# Patient Record
Sex: MALE | Race: WHITE | Employment: OTHER | URBAN - METROPOLITAN AREA
[De-identification: names, ages, dates, MRNs, and addresses within clinical notes are randomized per-mention and may not be internally consistent; named-entity substitution may affect disease eponyms.]

---

## 2017-06-01 ENCOUNTER — OFFICE VISIT (OUTPATIENT)
Dept: FAMILY MEDICINE CLINIC | Age: 67
End: 2017-06-01

## 2017-06-01 VITALS
TEMPERATURE: 97.8 F | BODY MASS INDEX: 25.47 KG/M2 | WEIGHT: 188 LBS | HEIGHT: 72 IN | HEART RATE: 80 BPM | RESPIRATION RATE: 16 BRPM | DIASTOLIC BLOOD PRESSURE: 76 MMHG | SYSTOLIC BLOOD PRESSURE: 118 MMHG | OXYGEN SATURATION: 98 %

## 2017-06-01 VITALS
HEIGHT: 72 IN | BODY MASS INDEX: 25.47 KG/M2 | HEART RATE: 80 BPM | OXYGEN SATURATION: 98 % | RESPIRATION RATE: 16 BRPM | TEMPERATURE: 97.8 F | WEIGHT: 188 LBS | DIASTOLIC BLOOD PRESSURE: 76 MMHG | SYSTOLIC BLOOD PRESSURE: 118 MMHG

## 2017-06-01 DIAGNOSIS — Z00.00 ROUTINE GENERAL MEDICAL EXAMINATION AT A HEALTH CARE FACILITY: Primary | ICD-10-CM

## 2017-06-01 DIAGNOSIS — Z12.5 SCREENING FOR PROSTATE CANCER: ICD-10-CM

## 2017-06-01 DIAGNOSIS — Z13.31 SCREENING FOR DEPRESSION: ICD-10-CM

## 2017-06-01 RX ORDER — AMOXICILLIN AND CLAVULANATE POTASSIUM 875; 125 MG/1; MG/1
875 TABLET, FILM COATED ORAL 2 TIMES DAILY
COMMUNITY
Start: 2017-05-31 | End: 2018-06-04 | Stop reason: ALTCHOICE

## 2017-06-01 RX ORDER — CHLORHEXIDINE GLUCONATE 1.2 MG/ML
RINSE ORAL
COMMUNITY
Start: 2017-05-18 | End: 2017-06-01

## 2017-06-01 RX ORDER — TRIAMCINOLONE ACETONIDE 1 MG/G
PASTE DENTAL
COMMUNITY
Start: 2017-05-09 | End: 2018-06-04 | Stop reason: ALTCHOICE

## 2017-06-01 RX ORDER — TADALAFIL 5 MG/1
5 TABLET ORAL AS NEEDED
Qty: 180 TAB | Refills: 1 | Status: CANCELLED | OUTPATIENT
Start: 2017-06-01

## 2017-06-01 RX ORDER — TRAMADOL HYDROCHLORIDE 50 MG/1
TABLET ORAL
COMMUNITY
Start: 2017-05-31 | End: 2017-06-01

## 2017-06-01 RX ORDER — CETIRIZINE HCL 10 MG
10 TABLET ORAL
COMMUNITY

## 2017-06-01 RX ORDER — IBUPROFEN 800 MG/1
800 TABLET ORAL
COMMUNITY
Start: 2017-05-31 | End: 2018-06-04 | Stop reason: ALTCHOICE

## 2017-06-01 NOTE — PROGRESS NOTES
This is a Subsequent Medicare Annual Wellness Visit providing Personalized Prevention Plan Services (PPPS) (Performed 12 months after initial AWV and PPPS )    I have reviewed the patient's medical history in detail and updated the computerized patient record. History     Past Medical History:   Diagnosis Date    Agatston coronary artery calcium score less than 100 2/10/2014    Coronary calcium score of 82.  Arthritis     right shoulder, SC joint    ED (erectile dysfunction)     Giant cell granuloma     oral, lower inner gingiva    H/O colonoscopy 3/2005 / 03-    6mm polyp every 5 years based on pathology report    Hearing disorder, sensorineural 1/28/2014    Using aides    Hypercholesteremia     Onychomycosis     toenails      Past Surgical History:   Procedure Laterality Date    HX CATARACT REMOVAL      HX ORTHOPAEDIC Right 1980    plantar fasciitis, neurofibroma?  HX OTHER SURGICAL  2008    peripheral giant cell granuloma removal     HX TURP  2009    for BPH     HX VASECTOMY  1984     Current Outpatient Prescriptions   Medication Sig Dispense Refill    amoxicillin-clavulanate (AUGMENTIN) 875-125 mg per tablet Take 875 Tabs by mouth two (2) times a day.  chlorhexidine (PERIDEX) 0.12 % solution       OXYMETAZOLINE HCL (AFRIN NASAL SPRAY NA) by Nasal route.  cetirizine (ZYRTEC) 10 mg tablet Take 10 mg by mouth two (2) times a day.  tadalafil (CIALIS) 5 mg tablet Take 1 Tab by mouth as needed. 180 Tab 1    KRILL OIL PO Take 1 capsule by mouth daily.  GLUCOSAMINE/CHONDROITIN SULF A (GLUCOSAMINE-CHONDROITIN PO) Take 1,500 mg by mouth daily.  BENEFIBER, GUAR GUM, PO Take  by mouth. 1 tbsp daily      multivitamin (ONE A DAY) tablet Take 1 Tab by mouth daily.  aspirin delayed-release 81 mg tablet Take 81 mg by mouth daily.  Cholecalciferol, Vitamin D3, (VITAMIN D3) 1,000 unit cap Take 2 tablets by mouth daily.       ibuprofen (MOTRIN) 800 mg tablet Take 800 mg by mouth every eight (8) hours as needed.  triamcinolone acetonide (KENALOG) 0.1 % dental paste       traMADol (ULTRAM) 50 mg tablet       ibuprofen (MOTRIN IB) 200 mg tablet Take 400 mg by mouth as needed for Pain. Allergies   Allergen Reactions    Benzocaine Swelling    Codeine Nausea Only     Family History   Problem Relation Age of Onset    Depression Mother     Alcohol abuse Mother     Other Mother      tobacco use    Breast Cancer Mother    Martha Muñoz Arthritis-osteo Mother     Cancer Mother 80     skin/pancreatic     Alcohol abuse Brother     Other Brother      tobacco use    Other Father      tobacco use    Hypertension Father     Arthritis-osteo Father      Social History   Substance Use Topics    Smoking status: Never Smoker    Smokeless tobacco: Never Used    Alcohol use 1.0 oz/week     2 Standard drinks or equivalent per week     Patient Active Problem List   Diagnosis Code    BPH (benign prostatic hypertrophy) N40.0    Hearing disorder, sensorineural H90.5    Erectile dysfunction N52.9    Dyslipidemia E78.5    Advanced care planning/counseling discussion Z71.89     Depression Risk Factor Screening:     PHQ over the last two weeks 6/1/2017   Little interest or pleasure in doing things Not at all   Feeling down, depressed or hopeless Not at all   Total Score PHQ 2 0     Alcohol Risk Factor Screening: On any occasion during the past 3 months, have you had more than 4 drinks containing alcohol? Yes    Do you average more than 14 drinks per week? No      Functional Ability and Level of Safety:     Hearing Loss   moderate  Wears hearing aids    Activities of Daily Living   Self-care. Requires assistance with: no ADLs    Fall Risk     Fall Risk Assessment, last 12 mths 6/1/2017   Able to walk? Yes   Fall in past 12 months?  No     Abuse Screen   Patient is not abused    Review of Systems   A comprehensive review of systems was negative except for that written in the HPI.    Physical Examination     Evaluation of Cognitive Function:  Mood/affect:  happy  Appearance: age appropriate and casually dressed  Family member/caregiver input:  None present    Visit Vitals    /76 (BP 1 Location: Left arm, BP Patient Position: Sitting)    Pulse 80    Temp 97.8 °F (36.6 °C) (Oral)    Resp 16    Ht 6' (1.829 m)    Wt 188 lb (85.3 kg)    SpO2 98%    BMI 25.5 kg/m2     General appearance: alert, cooperative, no distress, appears stated age  Head: Normocephalic, without obvious abnormality, atraumatic  Eyes: negative findings: anicteric sclera, lids and lashes normal, conjunctivae and sclerae normal, corneas clear and pupils equal, round, reactive to light and accomodation  Ears: normal TM's and external ear canals AU  Nose: Nares normal. Septum midline. Mucosa normal. No drainage or sinus tenderness. Throat: Lips, mucosa, and tongue normal. Teeth and gums normal  Neck: supple, symmetrical, trachea midline, no adenopathy, thyroid: not enlarged, symmetric, no tenderness/mass/nodules and no JVD  Lungs: clear to auscultation bilaterally  Heart: regular rate and rhythm, S1, S2 normal, no murmur, click, rub or gallop  Abdomen: normal findings: no masses palpable, no organomegaly, soft, non-tender  Extremities: no edema    Patient Care Team:  Paresh Kennedy MD as PCP - General (Family Practice)  Bessy Kim MD (Dermatology)  Shanae Gallagher MD (Ophthalmology)  Diego Stovall DPM (Podiatry)  Uvaldo Burk MD (Oral Surgery)  Hodan Limon MD (Gastroenterology)    Advice/Referrals/Counseling   Education and counseling provided:  Are appropriate based on today's review and evaluation    Assessment/Plan       ICD-10-CM ICD-9-CM    1. Routine general medical examination at a health care facility Z00.00 V70.0    2. Screening for depression Z13.89 V79.0 DEPRESSION SCREEN ANNUAL   3.  Screening for prostate cancer Z12.5 V76.44 PROSTATE SPECIFIC AG (PSA)     Age and sex specific counseling. Screening for depression and alcoholism. Advanced directives / end of life planning discussion and packet provided for review at home. Care team updated. Medicare recommended screening and prevention test outlook is reviewed and provided to patient. Screening and prevention is up to date but declined Hep C screen. Patient understands our medical plan. Patient has provided input and agrees with goals. All questions answered.

## 2017-06-01 NOTE — MR AVS SNAPSHOT
Visit Information Date & Time Provider Department Dept. Phone Encounter #  
 6/1/2017  9:15 AM Neida Webster, [address] [id number] Follow-up Instructions Return in about 1 year (around 6/1/2018) for annual medicare wellness. Upcoming Health Maintenance Date Due  
 MEDICARE YEARLY EXAM 6/10/2017 INFLUENZA AGE 9 TO ADULT 8/1/2017 GLAUCOMA SCREENING Q2Y 10/13/2017 COLONOSCOPY 3/11/2020 DTaP/Tdap/Td series (2 - Td) 5/13/2021 Allergies as of 6/1/2017  Review Complete On: 6/1/2017 By: Neida Webster MD  
  
 Severity Noted Reaction Type Reactions Benzocaine  01/28/2014    Swelling Codeine  01/28/2014    Nausea Only Current Immunizations  Reviewed on 10/24/2016 Name Date Influenza Vaccine 10/1/2016 Pneumococcal Conjugate (PCV-13) 10/24/2016 Pneumococcal Polysaccharide (PPSV-23) 7/28/2015 Tdap 5/13/2011 Zoster Vaccine, Live 8/5/2011 Not reviewed this visit Vitals BP Pulse Temp Resp Height(growth percentile) Weight(growth percentile) 118/76 (BP 1 Location: Left arm, BP Patient Position: Sitting) 80 97.8 °F (36.6 °C) (Oral) 16 6' (1.829 m) 188 lb (85.3 kg) SpO2 BMI Smoking Status 98% 25.5 kg/m2 Never Smoker Vitals History BMI and BSA Data Body Mass Index Body Surface Area 25.5 kg/m 2 2.08 m 2 Preferred Pharmacy Pharmacy Name Phone [facility] [address] [place] [address] [phone] Your Updated Medication List  
  
   
This list is accurate as of: 6/1/17  9:36 AM.  Always use your most recent med list.  
  
  
  
  
 amoxicillin-clavulanate 875-125 mg per tablet Commonly known as:  AUGMENTIN Take 875 Tabs by mouth two (2) times a day. aspirin delayed-release 81 mg tablet Take 81 mg by mouth daily. BENEFIBER (GUAR GUM) PO Take  by mouth. 1 tbsp daily  GLUCOSAMINE-CHONDROITIN PO  
 Take 1,500 mg by mouth daily. KRILL OIL PO Take 1 capsule by mouth daily. * MOTRIN  mg tablet Generic drug:  ibuprofen Take 400 mg by mouth as needed for Pain. * ibuprofen 800 mg tablet Commonly known as:  MOTRIN Take 800 mg by mouth every eight (8) hours as needed. multivitamin tablet Commonly known as:  ONE A DAY Take 1 Tab by mouth daily. tadalafil 5 mg tablet Commonly known as:  CIALIS Take 1 Tab by mouth as needed. triamcinolone acetonide 0.1 % dental paste Commonly known as:  KENALOG  
  
 VITAMIN D3 1,000 unit Cap Generic drug:  cholecalciferol Take 2 tablets by mouth daily. ZyrTEC 10 mg tablet Generic drug:  cetirizine Take 10 mg by mouth two (2) times a day. * Notice: This list has 2 medication(s) that are the same as other medications prescribed for you. Read the directions carefully, and ask your doctor or other care provider to review them with you. Follow-up Instructions Return in about 1 year (around 6/1/2018) for annual medicare wellness. Patient Instructions A Healthy Lifestyle: Care Instructions Your Care Instructions A healthy lifestyle can help you feel good, stay at a healthy weight, and have plenty of energy for both work and play. A healthy lifestyle is something you can share with your whole family. A healthy lifestyle also can lower your risk for serious health problems, such as high blood pressure, heart disease, and diabetes. You can follow a few steps listed below to improve your health and the health of your family. Follow-up care is a key part of your treatment and safety. Be sure to make and go to all appointments, and call your doctor if you are having problems. Its also a good idea to know your test results and keep a list of the medicines you take. How can you care for yourself at home? · Do not eat too much sugar, fat, or fast foods.  You can still have dessert and treats now and then. The goal is moderation. · Start small to improve your eating habits. Pay attention to portion sizes, drink less juice and soda pop, and eat more fruits and vegetables. ¨ Eat a healthy amount of food. A 3-ounce serving of meat, for example, is about the size of a deck of cards. Fill the rest of your plate with vegetables and whole grains. ¨ Limit the amount of soda and sports drinks you have every day. Drink more water when you are thirsty. ¨ Eat at least 5 servings of fruits and vegetables every day. It may seem like a lot, but it is not hard to reach this goal. A serving or helping is 1 piece of fruit, 1 cup of vegetables, or 2 cups of leafy, raw vegetables. Have an apple or some carrot sticks as an afternoon snack instead of a candy bar. Try to have fruits and/or vegetables at every meal. 
· Make exercise part of your daily routine. You may want to start with simple activities, such as walking, bicycling, or slow swimming. Try to be active 30 to 60 minutes every day. You do not need to do all 30 to 60 minutes all at once. For example, you can exercise 3 times a day for 10 or 20 minutes. Moderate exercise is safe for most people, but it is always a good idea to talk to your doctor before starting an exercise program. 
· Keep moving. Lavonne Vinay the lawn, work in the garden, or Rise. Take the stairs instead of the elevator at work. · If you smoke, quit. People who smoke have an increased risk for heart attack, stroke, cancer, and other lung illnesses. Quitting is hard, but there are ways to boost your chance of quitting tobacco for good. ¨ Use nicotine gum, patches, or lozenges. ¨ Ask your doctor about stop-smoking programs and medicines. ¨ Keep trying.  
In addition to reducing your risk of diseases in the future, you will notice some benefits soon after you stop using tobacco. If you have shortness of breath or asthma symptoms, they will likely get better within a few weeks after you quit. · Limit how much alcohol you drink. Moderate amounts of alcohol (up to 2 drinks a day for men, 1 drink a day for women) are okay. But drinking too much can lead to liver problems, high blood pressure, and other health problems. Family health If you have a family, there are many things you can do together to improve your health. · Eat meals together as a family as often as possible. · Eat healthy foods. This includes fruits, vegetables, lean meats and dairy, and whole grains. · Include your family in your fitness plan. Most people think of activities such as jogging or tennis as the way to fitness, but there are many ways you and your family can be more active. Anything that makes you breathe hard and gets your heart pumping is exercise. Here are some tips: 
¨ Walk to do errands or to take your child to school or the bus. ¨ Go for a family bike ride after dinner instead of watching TV. Where can you learn more? Go to http://maria eugenia-louis.info/. Enter W638 in the search box to learn more about \"A Healthy Lifestyle: Care Instructions. \" Current as of: July 26, 2016 Content Version: 11.2 © 1680-5770 [employer]. Care instructions adapted under license by [employer] (which disclaims liability or warranty for this information). If you have questions about a medical condition or this instruction, always ask your healthcare professional. [place] [address] any warranty or liability for your use of this information. Follow up in 1 year for annual medicare wellness [facility]! Dear Gracy Contreras: Thank you for requesting a [employer] account. Our records indicate that you already have an active [employer] account. You can access your account anytime at [email or web address] Did you know that you can access your hospital and ER discharge instructions at any time in [employer]? You can also review all of your test results from your hospital stay or ER visit. Additional Information If you have questions, please visit the Frequently Asked Questions section of the [employer] website at [email or web address] Remember, [employer] is NOT to be used for urgent needs. For medical emergencies, dial 911. Now available from your iPhone and Android! Please provide this summary of care documentation to your next provider. Your primary care clinician is listed as Uyen Cadena. If you have any questions after today's visit, please call [phone].

## 2017-06-01 NOTE — MR AVS SNAPSHOT
Visit Information Date & Time Provider Department Dept. Phone Encounter #  
 6/1/2017  9:00 AM Joel Sullivan, [address] [id number] Follow-up Instructions Return in about 1 year (around 6/1/2018) for  annual medicare wellness. Upcoming Health Maintenance Date Due  
 MEDICARE YEARLY EXAM 6/10/2017 INFLUENZA AGE 9 TO ADULT 8/1/2017 GLAUCOMA SCREENING Q2Y 10/13/2017 COLONOSCOPY 3/11/2020 DTaP/Tdap/Td series (2 - Td) 5/13/2021 Allergies as of 6/1/2017  Review Complete On: 6/1/2017 By: Joel Sullivan MD  
  
 Severity Noted Reaction Type Reactions Benzocaine  01/28/2014    Swelling Codeine  01/28/2014    Nausea Only Current Immunizations  Reviewed on 10/24/2016 Name Date Influenza Vaccine 10/1/2016 Pneumococcal Conjugate (PCV-13) 10/24/2016 Pneumococcal Polysaccharide (PPSV-23) 7/28/2015 Tdap 5/13/2011 Zoster Vaccine, Live 8/5/2011 Not reviewed this visit You Were Diagnosed With   
  
 Codes Comments Routine general medical examination at a health care facility    -  Primary ICD-10-CM: Z00.00 ICD-9-CM: V70.0 Screening for depression     ICD-10-CM: Z13.89 ICD-9-CM: V79.0 Screening for prostate cancer     ICD-10-CM: Z12.5 ICD-9-CM: V76.44 Vitals BP Pulse Temp Resp Height(growth percentile) Weight(growth percentile) 118/76 (BP 1 Location: Left arm, BP Patient Position: Sitting) 80 97.8 °F (36.6 °C) (Oral) 16 6' (1.829 m) 188 lb (85.3 kg) SpO2 BMI Smoking Status 98% 25.5 kg/m2 Never Smoker BMI and BSA Data Body Mass Index Body Surface Area 25.5 kg/m 2 2.08 m 2 Preferred Pharmacy Pharmacy Name Phone [facility] [place] [address] [phone] Your Updated Medication List  
  
   
This list is accurate as of: 6/1/17  9:35 AM.  Always use your most recent med list.  
  
  
  
  
 amoxicillin-clavulanate 875-125 mg per tablet Commonly known as:  AUGMENTIN Take 875 Tabs by mouth two (2) times a day. aspirin delayed-release 81 mg tablet Take 81 mg by mouth daily. BENEFIBER (GUAR GUM) PO Take  by mouth. 1 tbsp daily GLUCOSAMINE-CHONDROITIN PO Take 1,500 mg by mouth daily. KRILL OIL PO Take 1 capsule by mouth daily. * MOTRIN  mg tablet Generic drug:  ibuprofen Take 400 mg by mouth as needed for Pain. * ibuprofen 800 mg tablet Commonly known as:  MOTRIN Take 800 mg by mouth every eight (8) hours as needed. multivitamin tablet Commonly known as:  ONE A DAY Take 1 Tab by mouth daily. tadalafil 5 mg tablet Commonly known as:  CIALIS Take 1 Tab by mouth as needed. triamcinolone acetonide 0.1 % dental paste Commonly known as:  KENALOG  
  
 VITAMIN D3 1,000 unit Cap Generic drug:  cholecalciferol Take 2 tablets by mouth daily. ZyrTEC 10 mg tablet Generic drug:  cetirizine Take 10 mg by mouth two (2) times a day. * Notice: This list has 2 medication(s) that are the same as other medications prescribed for you. Read the directions carefully, and ask your doctor or other care provider to review them with you. We Performed the Following [place]  [[id number]] Follow-up Instructions Return in about 1 year (around 6/1/2018) for  annual medicare wellness. To-Do List   
 06/01/2017 Lab:  PROSTATE SPECIFIC AG (PSA) Patient Instructions Medicare Wellness Visit, Male The best way to live healthy is to have a healthy lifestyle by eating a well-balanced diet, exercising regularly, limiting alcohol and stopping smoking. Regular physical exams and screening tests are another way to keep healthy.   
Preventive exams provided by your health care provider can find health problems before they become diseases or illnesses. Preventive services including immunizations, screening tests, monitoring and exams can help you take care of your own health. All people over age 72 should have a pneumovax  and and a prevnar shot to prevent pneumonia. These are once in a lifetime unless you and your provider decide differently. All people over 65 should have a yearly flu shot and a tetanus vaccine every 10 years. Screening for diabetes mellitus with a blood sugar test should be done every year. Glaucoma is a disease of the eye due to increased ocular pressure that can lead to blindness and it should be done every year by an eye professional. 
 
Cardiovascular screening tests that check for elevated lipids (fatty part of blood) which can lead to heart disease and strokes should be done every 5 years. Colorectal screening that evaluates for blood or polyps in your colon should be done yearly as a stool test or every five years as a flexible sigmoidoscope or every 10 years as a colonoscopy up to age 76. Men up to age 76 may need a screening blood test for prostate cancer at certain intervals, depending on their personal and family history. This decision is between the patient and his provider. If you have been a smoker or had family history of abdominal aortic aneurysms, you and your provider may decide to schedule an ultrasound test of your aorta. Hepatitis C screening is also recommended for anyone born between [birth year] through [address]. A shingles vaccine is also recommended once in a lifetime after age 61. Your Medicare Wellness Exam is recommended annually. Here is a list of your current Health Maintenance items with a due date: 
Health Maintenance Due Topic Date Due  
 Hepatitis C Test  1950 Well Visit, Over 72: Care Instructions Your Care Instructions Physical exams can help you stay healthy.  Your doctor has checked your overall health and may have suggested ways to take good care of yourself. He or she also may have recommended tests. At home, you can help prevent illness with healthy eating, regular exercise, and other steps. Follow-up care is a key part of your treatment and safety. Be sure to make and go to all appointments, and call your doctor if you are having problems. It's also a good idea to know your test results and keep a list of the medicines you take. How can you care for yourself at home? · Reach and stay at a healthy weight. This will lower your risk for many problems, such as obesity, diabetes, heart disease, and high blood pressure. · Get at least 30 minutes of exercise on most days of the week. Walking is a good choice. You also may want to do other activities, such as running, swimming, cycling, or playing tennis or team sports. · Do not smoke. Smoking can make health problems worse. If you need help quitting, talk to your doctor about stop-smoking programs and medicines. These can increase your chances of quitting for good. · Protect your skin from too much sun. When you're outdoors from 10 a.m. to 4 p.m., stay in the shade or cover up with clothing and a hat with a wide brim. Wear sunglasses that block UV rays. Even when it's cloudy, put broad-spectrum sunscreen (SPF 30 or higher) on any exposed skin. · See a dentist one or two times a year for checkups and to have your teeth cleaned. · Wear a seat belt in the car. · Limit alcohol to 2 drinks a day for men and 1 drink a day for women. Too much alcohol can cause health problems. Follow your doctor's advice about when to have certain tests. These tests can spot problems early. For men and women · Cholesterol. Your doctor will tell you how often to have this done based on your overall health and other things that can increase your risk for heart attack and stroke. · Blood pressure.  Have your blood pressure checked during a routine doctor visit. Your doctor will tell you how often to check your blood pressure based on your age, your blood pressure results, and other factors. · Diabetes. Ask your doctor whether you should have tests for diabetes. · Vision. Experts recommend that you have yearly exams for glaucoma and other age-related eye problems. · Hearing. Tell your doctor if you notice any change in your hearing. You can have tests to find out how well you hear. · Colon cancer tests. Keep having colon cancer tests as your doctor recommends. You can have one of several types of tests. · Heart attack and stroke risk. At least every 4 to 6 years, you should have your risk for heart attack and stroke assessed. Your doctor uses factors such as your age, blood pressure, cholesterol, and whether you smoke or have diabetes to show what your risk for a heart attack or stroke is over the next 10 years. · Osteoporosis. Talk to your doctor about whether you should have a bone density test to find out whether you have thinning bones. Also ask your doctor about whether you should take calcium and vitamin D supplements. For women · Pap test and pelvic exam. You may no longer need a Pap test. Talk with your doctor about whether to stop or continue to have Pap tests. · Breast exam and mammogram. Ask how often you should have a mammogram, which is an X-ray of your breasts. A mammogram can spot breast cancer before it can be felt and when it is easiest to treat. · Thyroid disease. Talk to your doctor about whether to have your thyroid checked as part of a regular physical exam. Women have an increased chance of a thyroid problem. For men · Prostate exam. Talk to your doctor about whether you should have a blood test (called a PSA test) for prostate cancer. Experts disagree on whether men should have this test. Some experts recommend that you discuss the benefits and risks of the test with your doctor. · Abdominal aortic aneurysm. Ask your doctor whether you should have a test to check for an aneurysm. You may need a test if you ever smoked or if your parent, brother, sister, or child has had an aneurysm. When should you call for help? Watch closely for changes in your health, and be sure to contact your doctor if you have any problems or symptoms that concern you. Where can you learn more? Go to http://maria eugenia-louis.info/. Enter Q770 in the search box to learn more about \"Well Visit, Over 65: Care Instructions. \" Current as of: July 19, 2016 Content Version: 11.2 © 1462-6988 [employer]. Care instructions adapted under license by [employer] (which disclaims liability or warranty for this information). If you have questions about a medical condition or this instruction, always ask your healthcare professional. [address] any warranty or liability for your use of this information. Follow up in 1 year for annual medicare wellness Introducing [facility]! Dear Lety Montague: Thank you for requesting a [employer] account. Our records indicate that you already have an active [employer] account. You can access your account anytime at [email or web address] Did you know that you can access your hospital and ER discharge instructions at any time in Tap[employer]ents? You can also review all of your test results from your hospital stay or ER visit. Additional Information If you have questions, please visit the Frequently Asked Questions section of the [employer] website at [email or web address] Remember, [employer]ents is NOT to be used for urgent needs. For medical emergencies, dial 911. Now available from your iPhone and Android! Please provide this summary of care documentation to your next provider. Your primary care clinician is listed as Azeem Maldonado.  If you have any questions after today's visit, please call [phone].

## 2017-06-01 NOTE — PATIENT INSTRUCTIONS

## 2017-06-01 NOTE — PROGRESS NOTES
1. Have you been to the ER, urgent care clinic since your last visit? Hospitalized since your last visit? No    2. Have you seen or consulted any other health care providers outside of the [address] [place] [address] since your last visit? Include any pap smears or colon screening. Yes When: 12- Where: [facility] Reason for visit: cough   Abuse Screening Questionnaire 6/1/2017   Do you ever feel afraid of your partner? N   Are you in a relationship with someone who physically or mentally threatens you? N   Is it safe for you to go home? Y     Fall Risk Assessment, last 12 mths 6/1/2017   Able to walk? Yes   Fall in past 12 months?  No             PHQ over the last two weeks 6/1/2017   Little interest or pleasure in doing things Not at all   Feeling down, depressed or hopeless Not at all   Total Score PHQ 2 0

## 2017-06-01 NOTE — ACP (ADVANCE CARE PLANNING)
Advance Care Planning (ACP) Provider Conversation Snapshot    Date of ACP Conversation: 06/01/17  Persons included in Conversation:  patient  Length of ACP Conversation in minutes:  <16 minutes (Non-Billable)    Authorized Decision Maker (if patient is incapable of making informed decisions):    This person is:   not yet identified          For Patients with Decision Making Capacity:   n/d    Conversation Outcomes / Follow-Up Plan:   Recommended completion of Advance Directive form after review of ACP materials and conversation with prospective healthcare agent

## 2017-06-01 NOTE — PATIENT INSTRUCTIONS
Medicare Wellness Visit, Male    The best way to live healthy is to have a healthy lifestyle by eating a well-balanced diet, exercising regularly, limiting alcohol and stopping smoking. Regular physical exams and screening tests are another way to keep healthy. Preventive exams provided by your health care provider can find health problems before they become diseases or illnesses. Preventive services including immunizations, screening tests, monitoring and exams can help you take care of your own health. All people over age 72 should have a pneumovax  and and a prevnar shot to prevent pneumonia. These are once in a lifetime unless you and your provider decide differently. All people over 65 should have a yearly flu shot and a tetanus vaccine every 10 years. Screening for diabetes mellitus with a blood sugar test should be done every year. Glaucoma is a disease of the eye due to increased ocular pressure that can lead to blindness and it should be done every year by an eye professional.    Cardiovascular screening tests that check for elevated lipids (fatty part of blood) which can lead to heart disease and strokes should be done every 5 years. Colorectal screening that evaluates for blood or polyps in your colon should be done yearly as a stool test or every five years as a flexible sigmoidoscope or every 10 years as a colonoscopy up to age 76. Men up to age 76 may need a screening blood test for prostate cancer at certain intervals, depending on their personal and family history. This decision is between the patient and his provider. If you have been a smoker or had family history of abdominal aortic aneurysms, you and your provider may decide to schedule an ultrasound test of your aorta. Hepatitis C screening is also recommended for anyone born between [birth year] through [address]. A shingles vaccine is also recommended once in a lifetime after age 61.     Your Medicare Wellness Exam is recommended annually. Here is a list of your current Health Maintenance items with a due date:  Health Maintenance Due   Topic Date Due    Hepatitis C Test  1950          Well Visit, Over 72: Care Instructions  Your Care Instructions  Physical exams can help you stay healthy. Your doctor has checked your overall health and may have suggested ways to take good care of yourself. He or she also may have recommended tests. At home, you can help prevent illness with healthy eating, regular exercise, and other steps. Follow-up care is a key part of your treatment and safety. Be sure to make and go to all appointments, and call your doctor if you are having problems. It's also a good idea to know your test results and keep a list of the medicines you take. How can you care for yourself at home? · Reach and stay at a healthy weight. This will lower your risk for many problems, such as obesity, diabetes, heart disease, and high blood pressure. · Get at least 30 minutes of exercise on most days of the week. Walking is a good choice. You also may want to do other activities, such as running, swimming, cycling, or playing tennis or team sports. · Do not smoke. Smoking can make health problems worse. If you need help quitting, talk to your doctor about stop-smoking programs and medicines. These can increase your chances of quitting for good. · Protect your skin from too much sun. When you're outdoors from 10 a.m. to 4 p.m., stay in the shade or cover up with clothing and a hat with a wide brim. Wear sunglasses that block UV rays. Even when it's cloudy, put broad-spectrum sunscreen (SPF 30 or higher) on any exposed skin. · See a dentist one or two times a year for checkups and to have your teeth cleaned. · Wear a seat belt in the car. · Limit alcohol to 2 drinks a day for men and 1 drink a day for women. Too much alcohol can cause health problems. Follow your doctor's advice about when to have certain tests. These tests can spot problems early. For men and women  · Cholesterol. Your doctor will tell you how often to have this done based on your overall health and other things that can increase your risk for heart attack and stroke. · Blood pressure. Have your blood pressure checked during a routine doctor visit. Your doctor will tell you how often to check your blood pressure based on your age, your blood pressure results, and other factors. · Diabetes. Ask your doctor whether you should have tests for diabetes. · Vision. Experts recommend that you have yearly exams for glaucoma and other age-related eye problems. · Hearing. Tell your doctor if you notice any change in your hearing. You can have tests to find out how well you hear. · Colon cancer tests. Keep having colon cancer tests as your doctor recommends. You can have one of several types of tests. · Heart attack and stroke risk. At least every 4 to 6 years, you should have your risk for heart attack and stroke assessed. Your doctor uses factors such as your age, blood pressure, cholesterol, and whether you smoke or have diabetes to show what your risk for a heart attack or stroke is over the next 10 years. · Osteoporosis. Talk to your doctor about whether you should have a bone density test to find out whether you have thinning bones. Also ask your doctor about whether you should take calcium and vitamin D supplements. For women  · Pap test and pelvic exam. You may no longer need a Pap test. Talk with your doctor about whether to stop or continue to have Pap tests. · Breast exam and mammogram. Ask how often you should have a mammogram, which is an X-ray of your breasts. A mammogram can spot breast cancer before it can be felt and when it is easiest to treat. · Thyroid disease. Talk to your doctor about whether to have your thyroid checked as part of a regular physical exam. Women have an increased chance of a thyroid problem.   For men  · Prostate exam. Talk to your doctor about whether you should have a blood test (called a PSA test) for prostate cancer. Experts disagree on whether men should have this test. Some experts recommend that you discuss the benefits and risks of the test with your doctor. · Abdominal aortic aneurysm. Ask your doctor whether you should have a test to check for an aneurysm. You may need a test if you ever smoked or if your parent, brother, sister, or child has had an aneurysm. When should you call for help? Watch closely for changes in your health, and be sure to contact your doctor if you have any problems or symptoms that concern you. Where can you learn more? Go to http://maria eugenia-louis.info/. Enter Y210 in the search box to learn more about \"Well Visit, Over 65: Care Instructions. \"  Current as of: July 19, 2016  Content Version: 11.2  © 1147-0083 [employer]. Care instructions adapted under license by [employer] (which disclaims liability or warranty for this information). If you have questions about a medical condition or this instruction, always ask your healthcare professional. [place] [address] any warranty or liability for your use of this information.     Follow up in 1 year for annual medicare wellness

## 2017-06-06 ENCOUNTER — HOSPITAL ENCOUNTER (OUTPATIENT)
Dept: LAB | Age: 67
Discharge: HOME | End: 2017-06-06
Payer: MEDICARE

## 2017-06-06 LAB — PSA SERPL-MCNC: 0.4 NG/ML (ref 0–4)

## 2017-06-06 PROCEDURE — 84153 ASSAY OF PSA TOTAL: CPT | Performed by: FAMILY MEDICINE

## 2017-06-06 PROCEDURE — 36415 COLL VENOUS BLD VENIPUNCTURE: CPT | Performed by: FAMILY MEDICINE

## 2017-06-07 NOTE — PROGRESS NOTES
[phone] (home) place call to [address] [place] [address] and last 4 digits of ss# verified.  Viviana Hassan was given results that per Dr. Patel Rachel prostate cancer test was normal

## 2018-06-04 ENCOUNTER — OFFICE VISIT (OUTPATIENT)
Dept: FAMILY MEDICINE CLINIC | Age: 68
End: 2018-06-04

## 2018-06-04 ENCOUNTER — HOSPITAL ENCOUNTER (OUTPATIENT)
Dept: LAB | Age: 68
Discharge: HOME | End: 2018-06-04
Payer: COMMERCIAL

## 2018-06-04 VITALS
HEIGHT: 71 IN | DIASTOLIC BLOOD PRESSURE: 70 MMHG | WEIGHT: 188.13 LBS | HEART RATE: 54 BPM | OXYGEN SATURATION: 98 % | RESPIRATION RATE: 16 BRPM | BODY MASS INDEX: 26.34 KG/M2 | TEMPERATURE: 98 F | SYSTOLIC BLOOD PRESSURE: 122 MMHG

## 2018-06-04 DIAGNOSIS — Z00.00 MEDICARE ANNUAL WELLNESS VISIT, SUBSEQUENT: Primary | ICD-10-CM

## 2018-06-04 DIAGNOSIS — Z13.31 SCREENING FOR DEPRESSION: ICD-10-CM

## 2018-06-04 DIAGNOSIS — N52.9 ERECTILE DYSFUNCTION, UNSPECIFIED ERECTILE DYSFUNCTION TYPE: ICD-10-CM

## 2018-06-04 DIAGNOSIS — Z71.89 ADVANCED DIRECTIVES, COUNSELING/DISCUSSION: ICD-10-CM

## 2018-06-04 DIAGNOSIS — Z12.5 PROSTATE CANCER SCREENING: ICD-10-CM

## 2018-06-04 LAB — PSA SERPL-MCNC: 0.5 NG/ML (ref 0–4)

## 2018-06-04 PROCEDURE — 36415 COLL VENOUS BLD VENIPUNCTURE: CPT | Performed by: FAMILY MEDICINE

## 2018-06-04 PROCEDURE — 84153 ASSAY OF PSA TOTAL: CPT | Performed by: FAMILY MEDICINE

## 2018-06-04 RX ORDER — TADALAFIL 5 MG/1
5 TABLET ORAL AS NEEDED
Qty: 180 TAB | Refills: 1 | Status: SHIPPED | OUTPATIENT
Start: 2018-06-04 | End: 2019-04-18 | Stop reason: SDUPTHER

## 2018-06-04 NOTE — PROGRESS NOTES
1. Have you been to the ER, urgent care clinic since your last visit? Hospitalized since your last visit? No    2. Have you seen or consulted any other health care providers outside of the [address] [place] [address] since your last visit? Include any pap smears or colon screening. No     Abuse Screening Questionnaire 6/4/2018   Do you ever feel afraid of your partner? N   Are you in a relationship with someone who physically or mentally threatens you? N   Is it safe for you to go home? Y     Fall Risk Assessment, last 12 mths 6/4/2018   Able to walk? Yes   Fall in past 12 months?  No             PHQ over the last two weeks 6/4/2018   Little interest or pleasure in doing things Not at all   Feeling down, depressed or hopeless Not at all   Total Score PHQ 2 0

## 2018-06-04 NOTE — MR AVS SNAPSHOT
[facility] [address] [place] [address] 
[phone] Patient: Kala Rios MRN: [id number] WOZ:5/9[birth year]11 Visit Information Date & Time Provider Department Dept. Phone Encounter #  
 6/4/2018  8:30 AM Radha Perkins, [address] [place] [address] Road [id number] Follow-up Instructions Return in about 1 year (around 6/4/2019) for annual wellness only and please have non-fating labs done today (06-). Upcoming Health Maintenance Date Due  
 MEDICARE YEARLY EXAM 6/2/2018 Influenza Age 5 to Adult 8/1/2018 GLAUCOMA SCREENING Q2Y 10/14/2018 COLONOSCOPY 3/11/2020 DTaP/Tdap/Td series (2 - Td) 5/13/2021 Allergies as of 6/4/2018  Review Complete On: 6/4/2018 By: Radha Perkins MD  
  
 Severity Noted Reaction Type Reactions Benzocaine  01/28/2014    Swelling Codeine  01/28/2014    Nausea Only Current Immunizations  Reviewed on 6/4/2018 Name Date Influenza Vaccine 10/1/2016 Pneumococcal Conjugate (PCV-13) 10/24/2016 Pneumococcal Polysaccharide (PPSV-23) 7/28/2015 Tdap 5/13/2011 Zoster Vaccine, Live 8/5/2011 Reviewed by Nima Osman on 6/4/2018 at  8:19 AM  
 Reviewed by Radha Perkins MD on 6/4/2018 at  8:53 AM  
You Were Diagnosed With   
  
 Codes Comments Medicare annual wellness visit, subsequent    -  Primary ICD-10-CM: Z00.00 ICD-9-CM: V70.0 Prostate cancer screening     ICD-10-CM: Z12.5 ICD-9-CM: V76.44 Body mass index (BMI) of 25.0 to 29.9     ICD-10-CM: E66.3 ICD-9-CM: 278.02 Advanced directives, counseling/discussion     ICD-10-CM: Z71.89 ICD-9-CM: V65.49 Screening for depression     ICD-10-CM: Z13.89 ICD-9-CM: V79.0 Erectile dysfunction, unspecified erectile dysfunction type     ICD-10-CM: N52.9 ICD-9-CM: 607.84 Vitals BP Pulse Temp Resp Height(growth percentile) Weight(growth percentile) 122/70 (BP 1 Location: Left arm, BP Patient Position: Sitting) (!) 54 98 °F (36.7 °C) (Oral) 16 5' 11\" (1.803 m) 188 lb 2 oz (85.3 kg) SpO2 BMI Smoking Status 98% 26.24 kg/m2 Never Smoker BMI and BSA Data Body Mass Index Body Surface Area  
 26.24 kg/m 2 2.07 m 2 Preferred Pharmacy Pharmacy Name Phone [id number] - [place], [address] [facility] [phone] Your Updated Medication List  
  
   
This list is accurate as of 6/4/18  9:09 AM.  Always use your most recent med list.  
  
  
  
  
 aspirin delayed-release 81 mg tablet Take 81 mg by mouth daily. BENEFIBER (GUAR GUM) PO Take  by mouth. 1 tbsp daily GLUCOSAMINE-CHONDROITIN PO Take 1,500 mg by mouth daily. KRILL OIL PO Take 1 capsule by mouth daily. MOTRIN  mg tablet Generic drug:  ibuprofen Take 400 mg by mouth as needed for Pain.  
  
 multivitamin tablet Commonly known as:  ONE A DAY Take 1 Tab by mouth daily. tadalafil 5 mg tablet Commonly known as:  CIALIS Take 1 Tab by mouth as needed. VITAMIN D3 1,000 unit Cap Generic drug:  cholecalciferol Take 2 tablets by mouth daily. ZyrTEC 10 mg tablet Generic drug:  cetirizine Take 10 mg by mouth two (2) times a day. Prescriptions Printed Refills  
 tadalafil (CIALIS) 5 mg tablet 1 Sig: Take 1 Tab by mouth as needed. Class: Print Route: Oral  
  
We Performed the Following ADVANCE CARE PLANNING FIRST 30 MINS [16832 CPT(R)] [facility] [[id number]] Follow-up Instructions Return in about 1 year (around 6/4/2019) for annual wellness only and please have non-fating labs done today (06-). To-Do List   
 06/04/2018 Lab:  PSA SCREENING (SCREENING) Patient Instructions Medicare Wellness Visit, Male The best way to live healthy is to have a lifestyle where you eat a well-balanced diet, exercise regularly, limit alcohol use, and quit all forms of tobacco/nicotine, if applicable. Regular preventive services are another way to keep healthy. Preventive services (vaccines, screening tests, monitoring & exams) can help personalize your care plan, which helps you manage your own care. Screening tests can find health problems at the earliest stages, when they are easiest to treat. 50Alistair Maile Peter follows the current, evidence-based guidelines published by the [facility] ([facility]) when recommending preventive services for our patients. Because we follow these guidelines, sometimes recommendations change over time as research supports it. (For example, a prostate screening blood test is no longer routinely recommended for men with no symptoms.) Of course, you and your provider may decide to screen more often for some diseases, based on your risk and co-morbidities (chronic disease you are already diagnosed with). Preventive services for you include: - Medicare offers their members a free annual wellness visit, which is time for you and your primary care provider to discuss and plan for your preventive service needs. Take advantage of this benefit every year! 
 
-All people over age 72 should receive the recommended pneumonia vaccines. Current USPSTF guidelines recommend a series of two vaccines for the best pneumonia protection.  
 
-All adults should have a yearly flu vaccine and a tetanus vaccine every 10 years. All adults age 61 years should receive a shingles vaccine once in their lifetime.   
 
-All adults age 38-68 years who are overweight should have a diabetes screening test once every three years.  
 
-Other screening tests & preventive services for persons with diabetes include: an eye exam to screen for diabetic retinopathy, a kidney function test, a foot exam, and stricter control over your cholesterol. -Cardiovascular screening for adults with routine risk involves an electrocardiogram (ECG) at intervals determined by the provider.  
 
-Colorectal cancer screenings should be done for adults age 54-65 years with normal risk. There are a number of acceptable methods of screening for this type of cancer. Each test has its own benefits and drawbacks. Discuss with your provider what is most appropriate for you during your annual wellness visit. The different tests include: colonoscopy (considered the best screening method), a fecal occult blood test, a fecal DNA test, and sigmoidoscopy. 
 
-All adults born between [facility] should be screened once for Hepatitis C. 
 
-An Abdominal Aortic Aneurysm (AAA) Screening is recommended for men age 73-68 who has ever smoked in their lifetime. Here is a list of your current Health Maintenance items (your personalized list of preventive services) with a due date: 
Health Maintenance Due Topic Date Due  
 Annual Well Visit  06/02/2018 Well Visit, Over 72: Care Instructions Your Care Instructions Physical exams can help you stay healthy. Your doctor has checked your overall health and may have suggested ways to take good care of yourself. He or she also may have recommended tests. At home, you can help prevent illness with healthy eating, regular exercise, and other steps. Follow-up care is a key part of your treatment and safety. Be sure to make and go to all appointments, and call your doctor if you are having problems. It's also a good idea to know your test results and keep a list of the medicines you take. How can you care for yourself at home? · Reach and stay at a healthy weight. This will lower your risk for many problems, such as obesity, diabetes, heart disease, and high blood pressure. · Get at least 30 minutes of exercise on most days of the week. Walking is a good choice.  You also may want to do other activities, such as running, swimming, cycling, or playing tennis or team sports. · Do not smoke. Smoking can make health problems worse. If you need help quitting, talk to your doctor about stop-smoking programs and medicines. These can increase your chances of quitting for good. · Protect your skin from too much sun. When you're outdoors from 10 a.m. to 4 p.m., stay in the shade or cover up with clothing and a hat with a wide brim. Wear sunglasses that block UV rays. Even when it's cloudy, put broad-spectrum sunscreen (SPF 30 or higher) on any exposed skin. · See a dentist one or two times a year for checkups and to have your teeth cleaned. · Wear a seat belt in the car. · Limit alcohol to 2 drinks a day for men and 1 drink a day for women. Too much alcohol can cause health problems. Follow your doctor's advice about when to have certain tests. These tests can spot problems early. For men and women · Cholesterol. Your doctor will tell you how often to have this done based on your overall health and other things that can increase your risk for heart attack and stroke. · Blood pressure. Have your blood pressure checked during a routine doctor visit. Your doctor will tell you how often to check your blood pressure based on your age, your blood pressure results, and other factors. · Diabetes. Ask your doctor whether you should have tests for diabetes. · Vision. Experts recommend that you have yearly exams for glaucoma and other age-related eye problems. · Hearing. Tell your doctor if you notice any change in your hearing. You can have tests to find out how well you hear. · Colon cancer tests. Keep having colon cancer tests as your doctor recommends. You can have one of several types of tests. · Heart attack and stroke risk. At least every 4 to 6 years, you should have your risk for heart attack and stroke assessed.  Your doctor uses factors such as your age, blood pressure, cholesterol, and whether you smoke or have diabetes to show what your risk for a heart attack or stroke is over the next 10 years. · Osteoporosis. Talk to your doctor about whether you should have a bone density test to find out whether you have thinning bones. Also ask your doctor about whether you should take calcium and vitamin D supplements. For women · Pap test and pelvic exam. You may no longer need a Pap test. Talk with your doctor about whether to stop or continue to have Pap tests. · Breast exam and mammogram. Ask how often you should have a mammogram, which is an X-ray of your breasts. A mammogram can spot breast cancer before it can be felt and when it is easiest to treat. · Thyroid disease. Talk to your doctor about whether to have your thyroid checked as part of a regular physical exam. Women have an increased chance of a thyroid problem. For men · Prostate exam. Talk to your doctor about whether you should have a blood test (called a PSA test) for prostate cancer. Experts disagree on whether men should have this test. Some experts recommend that you discuss the benefits and risks of the test with your doctor. · Abdominal aortic aneurysm. Ask your doctor whether you should have a test to check for an aneurysm. You may need a test if you ever smoked or if your parent, brother, sister, or child has had an aneurysm. When should you call for help? Watch closely for changes in your health, and be sure to contact your doctor if you have any problems or symptoms that concern you. Where can you learn more? Go to http://maria eugenia-louis.info/. Enter T775 in the search box to learn more about \"Well Visit, Over 65: Care Instructions. \" Current as of: May 12, 2017 Content Version: 11.4 © 5871-9429 [employer]wise, Incorporated. Care instructions adapted under license by [employer] (which disclaims liability or warranty for this information).  If you have questions about a medical condition or this instruction, always ask your healthcare professional. [address] any warranty or liability for your use of this information. Follow up in 1 year for annual wellness only and please have non-fating labs done today (06-) Introducing [facility]! Dear Claus Negrete: Thank you for requesting a [employer] account. Our records indicate that you already have an active [employer] account. You can access your account anytime at [email or web address] Did you know that you can access your hospital and ER discharge instructions at any time in [employer]? You can also review all of your test results from your hospital stay or ER visit. Additional Information If you have questions, please visit the Frequently Asked Questions section of the [employer] website at [email or web address] Remember, [employer] is NOT to be used for urgent needs. For medical emergencies, dial 911. Now available from your iPhone and Android! Please provide this summary of care documentation to your next provider. Your primary care clinician is listed as Nanda Holguin. If you have any questions after today's visit, please call [phone].

## 2018-06-04 NOTE — PROGRESS NOTES
This is the Subsequent Medicare Annual Wellness Exam, performed 12 months or more after the Initial AWV or the last Subsequent AWV    I have reviewed the patient's medical history in detail and updated the computerized patient record. History     Past Medical History:   Diagnosis Date    Agatston coronary artery calcium score less than 100 2/10/2014    Coronary calcium score of 82.  Arthritis     right shoulder, SC joint    BPH (benign prostatic hyperplasia)     has tried avodart and flomax unsuccessfully in the 90s.  ED (erectile dysfunction)     Giant cell granuloma     oral, lower inner gingiva    H/O colonoscopy 3/2005 / 03-    6mm polyp every 5 years based on pathology report    Hearing disorder, sensorineural 1/28/2014    Using aides    Hypercholesteremia     Neutropenia ([facility] [address] 2016    Sees heme/onc    Onychomycosis     toenails      Past Surgical History:   Procedure Laterality Date    HX CATARACT REMOVAL      HX ORTHOPAEDIC Right 1980    plantar fasciitis, neurofibroma?  HX OTHER SURGICAL  2008    peripheral giant cell granuloma removal     HX TURP  2009    for BPH     HX VASECTOMY  1984     Current Outpatient Prescriptions   Medication Sig Dispense Refill    tadalafil (CIALIS) 5 mg tablet Take 1 Tab by mouth as needed. 180 Tab 1    cetirizine (ZYRTEC) 10 mg tablet Take 10 mg by mouth two (2) times a day.  KRILL OIL PO Take 1 capsule by mouth daily.  GLUCOSAMINE/CHONDROITIN SULF A (GLUCOSAMINE-CHONDROITIN PO) Take 1,500 mg by mouth daily.  BENEFIBER, GUAR GUM, PO Take  by mouth. 1 tbsp daily      multivitamin (ONE A DAY) tablet Take 1 Tab by mouth daily.  aspirin delayed-release 81 mg tablet Take 81 mg by mouth daily.  Cholecalciferol, Vitamin D3, (VITAMIN D3) 1,000 unit cap Take 2 tablets by mouth daily.  ibuprofen (MOTRIN IB) 200 mg tablet Take 400 mg by mouth as needed for Pain.        Allergies   Allergen Reactions    Benzocaine Swelling  Codeine Nausea Only     Family History   Problem Relation Age of Onset    Depression Mother     Alcohol abuse Mother     Other Mother      tobacco use    Breast Cancer Mother    [facility] Arthritis-osteo Mother     Cancer Mother 80     skin/pancreatic     Alcohol abuse Brother     Other Brother      tobacco use    Other Father      tobacco use    Hypertension Father     Arthritis-osteo Father     Lung Cancer Father      Social History   Substance Use Topics    Smoking status: Never Smoker    Smokeless tobacco: Never Used    Alcohol use 1.0 oz/week     2 Standard drinks or equivalent per week     Patient Active Problem List   Diagnosis Code    BPH (benign prostatic hypertrophy) N40.0    Hearing disorder, sensorineural H90.5    Erectile dysfunction N52.9    Dyslipidemia E78.5    Advanced care planning/counseling discussion Z71.89       Depression Risk Factor Screening:     PHQ over the last two weeks 6/4/2018   Little interest or pleasure in doing things Not at all   Feeling down, depressed or hopeless Not at all   Total Score PHQ 2 0     Alcohol Risk Factor Screening: On any occasion in the past three months you have had more than 7 drinks containing alcohol    Functional Ability and Level of Safety:   Hearing Loss  The patient wears hearing aids. Activities of Daily Living  The home contains: no safety equipment. Patient does total self care    Fall Risk  Fall Risk Assessment, last 12 mths 6/4/2018   Able to walk? Yes   Fall in past 12 months?  No       Abuse Screen  Patient is not abused    Cognitive Screening   Evaluation of Cognitive Function:  Has your family/caregiver stated any concerns about your memory: not significantly  Normal    Patient Care Team   Patient Care Team:  Kahlil Bermudez MD as PCP - General (Family Practice)  Avril Willingham MD (Dermatology)  Shar Marin MD (Ophthalmology)  Mardy Hodgkin, MD (Oral Surgery)  Mellisa Olson MD (Gastroenterology)  Barb Lakhani DO (Hematology and Oncology)    Assessment/Plan   Education and counseling provided:  Are appropriate based on today's review and evaluation      ICD-10-CM ICD-9-CM    1. Medicare annual wellness visit, subsequent Z00.00 V70.0    2. Prostate cancer screening Z12.5 V76.44 tadalafil (CIALIS) 5 mg tablet      PSA SCREENING (SCREENING)   3. Body mass index (BMI) of 25.0 to 29.9 E66.3 278.02    4. Advanced directives, counseling/discussion Z71.89 V65.49 ADVANCE CARE PLANNING FIRST 30 MINS   5. Screening for depression Z13.89 V79.0 DEPRESSION SCREEN ANNUAL   6. Erectile dysfunction, unspecified erectile dysfunction type N52.9 1.80      Age and sex specific counseling. Screening for depression and alcoholism. Advanced directives / end of life planning discussion - see ACP note. Care team updated. Vaccines are up to date. He is going to get Shingrix as well at a local pharmacy. Medicare recommended screening and prevention test guidelines are filled out, reviewed, and provided to patient. Screening and prevention is up to date. Patient understands our medical plan. Patient has provided input and agrees with goals. All questions answered.

## 2018-06-04 NOTE — ACP (ADVANCE CARE PLANNING)
Advance Care Planning    Advance Care Planning (ACP) Provider Note - Comprehensive     Date of ACP Conversation: 06/04/18  Persons included in Conversation:  patient  Length of ACP Conversation in minutes:  16 minutes    Authorized Decision Maker (if patient is incapable of making informed decisions): This person is:  Healthcare Agent/Medical Power of  under Advance Directive  Bryanna Abdullahi, wife  Cecil Duke, youngest daughter (pediatric GI physician). General ACP for ALL Patients with Decision Making Capacity:   Importance of advance care planning, including choosing a healthcare agent to communicate patient's healthcare decisions if patient lost the ability to make decisions, such as after a sudden illness or accident  Understanding of the healthcare agent role was assessed and information provided    Review of Existing Advance Directive:  Does this advance directive still reflect your preferences?   Yes (Provide new form/Refer for assistance in updating)    For Serious or Chronic Illness:  No known illness    Interventions Provided:  Reviewed existing Advance Directive   Recommended communicating the plan and making copies for the healthcare agent, personal physician, and others as appropriate (e.g., health system)  Recommended review of completed ACP document annually or upon change in health status

## 2018-06-04 NOTE — PATIENT INSTRUCTIONS
Medicare Wellness Visit, Male    The best way to live healthy is to have a lifestyle where you eat a well-balanced diet, exercise regularly, limit alcohol use, and quit all forms of tobacco/nicotine, if applicable. Regular preventive services are another way to keep healthy. Preventive services (vaccines, screening tests, monitoring & exams) can help personalize your care plan, which helps you manage your own care. Screening tests can find health problems at the earliest stages, when they are easiest to treat. [facility] follows the current, evidence-based guidelines published by the [facility] ([facility]) when recommending preventive services for our patients. Because we follow these guidelines, sometimes recommendations change over time as research supports it. (For example, a prostate screening blood test is no longer routinely recommended for men with no symptoms.)    Of course, you and your provider may decide to screen more often for some diseases, based on your risk and co-morbidities (chronic disease you are already diagnosed with). Preventive services for you include:    - Medicare offers their members a free annual wellness visit, which is time for you and your primary care provider to discuss and plan for your preventive service needs. Take advantage of this benefit every year!    -All people over age 72 should receive the recommended pneumonia vaccines. Current USPSTF guidelines recommend a series of two vaccines for the best pneumonia protection.     -All adults should have a yearly flu vaccine and a tetanus vaccine every 10 years.  All adults age 61 years should receive a shingles vaccine once in their lifetime.      -All adults age 38-68 years who are overweight should have a diabetes screening test once every three years.     -Other screening tests & preventive services for persons with diabetes include: an eye exam to screen for diabetic retinopathy, a kidney function test, a foot exam, and stricter control over your cholesterol.     -Cardiovascular screening for adults with routine risk involves an electrocardiogram (ECG) at intervals determined by the provider.     -Colorectal cancer screenings should be done for adults age 54-65 years with normal risk. There are a number of acceptable methods of screening for this type of cancer. Each test has its own benefits and drawbacks. Discuss with your provider what is most appropriate for you during your annual wellness visit. The different tests include: colonoscopy (considered the best screening method), a fecal occult blood test, a fecal DNA test, and sigmoidoscopy.    -All adults born between [facility] should be screened once for Hepatitis C.    -An Abdominal Aortic Aneurysm (AAA) Screening is recommended for men age 73-68 who has ever smoked in their lifetime. Here is a list of your current Health Maintenance items (your personalized list of preventive services) with a due date:  Health Maintenance Due   Topic Date Due    Annual Well Visit  06/02/2018        Well Visit, Over 72: Care Instructions  Your Care Instructions    Physical exams can help you stay healthy. Your doctor has checked your overall health and may have suggested ways to take good care of yourself. He or she also may have recommended tests. At home, you can help prevent illness with healthy eating, regular exercise, and other steps. Follow-up care is a key part of your treatment and safety. Be sure to make and go to all appointments, and call your doctor if you are having problems. It's also a good idea to know your test results and keep a list of the medicines you take. How can you care for yourself at home? · Reach and stay at a healthy weight. This will lower your risk for many problems, such as obesity, diabetes, heart disease, and high blood pressure. · Get at least 30 minutes of exercise on most days of the week. Walking is a good choice. You also may want to do other activities, such as running, swimming, cycling, or playing tennis or team sports. · Do not smoke. Smoking can make health problems worse. If you need help quitting, talk to your doctor about stop-smoking programs and medicines. These can increase your chances of quitting for good. · Protect your skin from too much sun. When you're outdoors from 10 a.m. to 4 p.m., stay in the shade or cover up with clothing and a hat with a wide brim. Wear sunglasses that block UV rays. Even when it's cloudy, put broad-spectrum sunscreen (SPF 30 or higher) on any exposed skin. · See a dentist one or two times a year for checkups and to have your teeth cleaned. · Wear a seat belt in the car. · Limit alcohol to 2 drinks a day for men and 1 drink a day for women. Too much alcohol can cause health problems. Follow your doctor's advice about when to have certain tests. These tests can spot problems early. For men and women  · Cholesterol. Your doctor will tell you how often to have this done based on your overall health and other things that can increase your risk for heart attack and stroke. · Blood pressure. Have your blood pressure checked during a routine doctor visit. Your doctor will tell you how often to check your blood pressure based on your age, your blood pressure results, and other factors. · Diabetes. Ask your doctor whether you should have tests for diabetes. · Vision. Experts recommend that you have yearly exams for glaucoma and other age-related eye problems. · Hearing. Tell your doctor if you notice any change in your hearing. You can have tests to find out how well you hear. · Colon cancer tests. Keep having colon cancer tests as your doctor recommends. You can have one of several types of tests. · Heart attack and stroke risk. At least every 4 to 6 years, you should have your risk for heart attack and stroke assessed.  Your doctor uses factors such as your age, blood pressure, cholesterol, and whether you smoke or have diabetes to show what your risk for a heart attack or stroke is over the next 10 years. · Osteoporosis. Talk to your doctor about whether you should have a bone density test to find out whether you have thinning bones. Also ask your doctor about whether you should take calcium and vitamin D supplements. For women  · Pap test and pelvic exam. You may no longer need a Pap test. Talk with your doctor about whether to stop or continue to have Pap tests. · Breast exam and mammogram. Ask how often you should have a mammogram, which is an X-ray of your breasts. A mammogram can spot breast cancer before it can be felt and when it is easiest to treat. · Thyroid disease. Talk to your doctor about whether to have your thyroid checked as part of a regular physical exam. Women have an increased chance of a thyroid problem. For men  · Prostate exam. Talk to your doctor about whether you should have a blood test (called a PSA test) for prostate cancer. Experts disagree on whether men should have this test. Some experts recommend that you discuss the benefits and risks of the test with your doctor. · Abdominal aortic aneurysm. Ask your doctor whether you should have a test to check for an aneurysm. You may need a test if you ever smoked or if your parent, brother, sister, or child has had an aneurysm. When should you call for help? Watch closely for changes in your health, and be sure to contact your doctor if you have any problems or symptoms that concern you. Where can you learn more? Go to http://maria eugenia-louis.info/. Enter Y821 in the search box to learn more about \"Well Visit, Over 65: Care Instructions. \"  Current as of: May 12, 2017  Content Version: 11.4  © 4693-8985 [employer]. Care instructions adapted under license by [employer] (which disclaims liability or warranty for this information).  If you have questions about a medical condition or this instruction, always ask your healthcare professional. [address] any warranty or liability for your use of this information.     Follow up in 1 year for annual wellness only and please have non-fating labs done today (06-)

## 2018-06-05 NOTE — PROGRESS NOTES
[phone] (mobile)  and last 4 digits of SS# verified.  Samantha Parnell was informed that per Dr. Elizabeth Escobar his PSA level was normal

## 2019-04-18 ENCOUNTER — OFFICE VISIT (OUTPATIENT)
Dept: FAMILY MEDICINE CLINIC | Age: 69
End: 2019-04-18

## 2019-04-18 VITALS
TEMPERATURE: 98 F | WEIGHT: 188 LBS | RESPIRATION RATE: 16 BRPM | SYSTOLIC BLOOD PRESSURE: 124 MMHG | HEIGHT: 71 IN | DIASTOLIC BLOOD PRESSURE: 80 MMHG | BODY MASS INDEX: 26.32 KG/M2 | OXYGEN SATURATION: 98 % | HEART RATE: 70 BPM

## 2019-04-18 DIAGNOSIS — M25.561 ACUTE PAIN OF RIGHT KNEE: Primary | ICD-10-CM

## 2019-04-18 DIAGNOSIS — N52.9 ERECTILE DYSFUNCTION, UNSPECIFIED ERECTILE DYSFUNCTION TYPE: ICD-10-CM

## 2019-04-18 DIAGNOSIS — Z12.5 PROSTATE CANCER SCREENING: ICD-10-CM

## 2019-04-18 RX ORDER — TADALAFIL 5 MG/1
5 TABLET ORAL DAILY
Qty: 180 TAB | Refills: 1 | Status: SHIPPED | OUTPATIENT
Start: 2019-04-18 | End: 2020-06-11 | Stop reason: SDUPTHER

## 2019-04-18 RX ORDER — METHYLPREDNISOLONE 4 MG/1
TABLET ORAL
Qty: 1 DOSE PACK | Refills: 0 | Status: SHIPPED | OUTPATIENT
Start: 2019-04-18 | End: 2019-06-11 | Stop reason: ALTCHOICE

## 2019-04-18 NOTE — PROGRESS NOTES
SUBJECTIVE  Chief Complaint   Patient presents with    Knee Pain     c/o right knee pain      The patient presents complaining of right knee pain. Location: deep in knee joint, cannot tell exactly where   Quality: catching, sharp, intermittent  Injury: says it started acutely as he was kicking when swimming   Duration: a few days  Radiation: denies   Swelling: denies   Instability: denies  Locking: yes at times feels like catching but not locking   Aggravating factors: full extension, walking   Relieving factors: nothing   History of similar:  Has history of patellofemoral arthritis and had to stop running   X-rays: not yet  PT: not yet    OBJECTIVE    Blood pressure 124/80, pulse 70, temperature 98 °F (36.7 °C), temperature source Oral, resp. rate 16, height 5' 11\" (1.803 m), weight 188 lb (85.3 kg), SpO2 98 %. General:  alert, cooperative, well appearing, in no apparent distress. Musculoskeletal:  Gait is antalgic with favoring of left knee. Right Knee: Inspection of the knee demonstrates there is no obvious deformity. There is a trace effusion. There is no evidence of dislocation. There is normal flexion and extension of the bilateral knee. Full flexion is minimally painful. Assessment of ligamentous stability demonstrates the ACL/PCL/LCL/MCL are all intact. There is no pain with varus or valgus strain. Palpation demonstrates there is no medial or lateral joint line tenderness. There is no tenderness of the pes anserine bursa. There is no tenderness along the tibial tuberosity or patellar tendon. There is no tenderness under the medial facet of the patella. There is no apprehension with patellar subluxation. There is no pain with rocking or grinding of the patella but there is a gritty feel to that motion under the patella. The bilateral knee demonstrates normal muscle tone. Skin:  There is no redness or warmth. No local rashes. There is no bruising. Psych: normal affect. Mood good. Oriented x 3. Judgement and insight intact. ASSESSMENT / PLAN    ICD-10-CM ICD-9-CM    1. Acute pain of right knee M25.561 719.46    2. Erectile dysfunction, unspecified erectile dysfunction type N52.9 607.84    3. Prostate cancer screening Z12.5 V76.44 tadalafil (CIALIS) 5 mg tablet      PSA SCREENING (SCREENING)     Knee pain - acute on chronic. Likely a flare up of pre-existing arthritis. Could also have worsened a degenerative meniscus. He has an appt with ortho soon which he is encouraged to keep. We discussed a trial of Medrol which he agrees to. He agrees to wait for ortho to obtain films since they may be particular about the views ordered. An MRI is not indicated at this time. ED - refills of Cialis. All chart history elements were reviewed by me at the time of the visit even though marked at time of note closure. Patient understands our medical plan. Patient has provided input and agrees with goals. Alternatives have been explained and offered. All questions answered. The patient is to call if condition worsens or fails to improve. Follow-up and Dispositions    · Return in about 8 weeks (around 6/11/2019) for annual medicare wellness and please have labs done 3-7 days prior.

## 2019-04-18 NOTE — PATIENT INSTRUCTIONS
Knee Arthritis: Care Instructions  Your Care Instructions    Knee arthritis is a breakdown of the cartilage that cushions your knee joint. When the cartilage wears down, your bones rub against each other. This causes pain and stiffness. Knee arthritis tends to get worse with time. Treatment for knee arthritis involves reducing pain, making the leg muscles stronger, and staying at a healthy body weight. The treatment usually does not improve the health of the cartilage, but it can reduce pain and improve how well your knee works. You can take simple measures to protect your knee joints, ease your pain, and help you stay active. Follow-up care is a key part of your treatment and safety. Be sure to make and go to all appointments, and call your doctor if you are having problems. It's also a good idea to know your test results and keep a list of the medicines you take. How can you care for yourself at home? · Know that knee arthritis will cause more pain on some days than on others. · Stay at a healthy weight. Lose weight if you are overweight. When you stand up, the pressure on your knees from every pound of body weight is multiplied four times. So if you lose 10 pounds, you will reduce the pressure on your knees by 40 pounds. · Talk to your doctor or physical therapist about exercises that will help ease joint pain. ? Stretch to help prevent stiffness and to prevent injury before you exercise. You may enjoy gentle forms of yoga to help keep your knee joints and muscles flexible. ? Walk instead of jog.  ? Ride a bike. This makes your thigh muscles stronger and takes pressure off your knee. ? Wear well-fitting and comfortable shoes. ? Exercise in chest-deep water. This can help you exercise longer with less pain. ? Avoid exercises that include squatting or kneeling. They can put a lot of strain on your knees.   ? Talk to your doctor to make sure that the exercise you do is not making the arthritis worse.  · Do not sit for long periods of time. Try to walk once in a while to keep your knee from getting stiff. · Ask your doctor or physical therapist whether shoe inserts may reduce your arthritis pain. · If you can afford it, get new athletic shoes at least every year. This can help reduce the strain on your knees. · Use a device to help you do everyday activities. ? A cane or walking stick can help you keep your balance when you walk. Hold the cane or walking stick in the hand opposite the painful knee. ? If you feel like you may fall when you walk, try using crutches or a front-wheeled walker. These can prevent falls that could cause more damage to your knee. ? A knee brace may help keep your knee stable and prevent pain. ? You also can use other things to make life easier, such as a higher toilet seat and handrails in the bathtub or shower. · Take pain medicines exactly as directed. ? Do not wait until you are in severe pain. You will get better results if you take it sooner. ? If you are not taking a prescription pain medicine, take an over-the-counter medicine such as acetaminophen (Tylenol), ibuprofen (Advil, Motrin), or naproxen (Aleve). Read and follow all instructions on the label. ? Do not take two or more pain medicines at the same time unless the doctor told you to. Many pain medicines have acetaminophen, which is Tylenol. Too much acetaminophen (Tylenol) can be harmful. ? Tell your doctor if you take a blood thinner, have diabetes, or have allergies to shellfish. · Ask your doctor if you might benefit from a shot of steroid medicine into your knee. This may provide pain relief for several months. · Many people take the supplements glucosamine and chondroitin for osteoarthritis. Some people feel they help, but the medical research does not show that they work. Talk to your doctor before you take these supplements. When should you call for help?   Call your doctor now or seek immediate medical care if:    · You have sudden swelling, warmth, or pain in your knee.     · You have knee pain and a fever or rash.     · You have such bad pain that you cannot use your knee.    Watch closely for changes in your health, and be sure to contact your doctor if you have any problems. Where can you learn more? Go to http://maria eugenia-louis.info/. Enter Q481 in the search box to learn more about \"Knee Arthritis: Care Instructions. \"  Current as of: Ivonne 10, 2018  Content Version: 11.9  © 7908-0602 [employer]. Care instructions adapted under license by [employer] (which disclaims liability or warranty for this information). If you have questions about a medical condition or this instruction, always ask your healthcare professional. [address] any warranty or liability for your use of this information.

## 2019-04-18 NOTE — PROGRESS NOTES
1. Have you been to the ER, urgent care clinic since your last visit? Hospitalized since your last visit? No    2. Have you seen or consulted any other health care providers outside of the [address] [place] [address] since your last visit? Include any pap smears or colon screening.  No

## 2019-04-29 ENCOUNTER — HOSPITAL ENCOUNTER (OUTPATIENT)
Dept: LAB | Age: 69
Discharge: HOME | End: 2019-04-29
Payer: MEDICARE

## 2019-04-29 DIAGNOSIS — Z12.5 PROSTATE CANCER SCREENING: ICD-10-CM

## 2019-04-29 LAB — PSA SERPL-MCNC: 0.7 NG/ML (ref 0–4)

## 2019-04-29 PROCEDURE — 36415 COLL VENOUS BLD VENIPUNCTURE: CPT

## 2019-04-29 PROCEDURE — 84153 ASSAY OF PSA TOTAL: CPT

## 2019-06-11 ENCOUNTER — OFFICE VISIT (OUTPATIENT)
Dept: FAMILY MEDICINE CLINIC | Age: 69
End: 2019-06-11

## 2019-06-11 VITALS
HEART RATE: 67 BPM | DIASTOLIC BLOOD PRESSURE: 76 MMHG | OXYGEN SATURATION: 98 % | TEMPERATURE: 98 F | RESPIRATION RATE: 16 BRPM | BODY MASS INDEX: 26.32 KG/M2 | HEIGHT: 71 IN | SYSTOLIC BLOOD PRESSURE: 124 MMHG | WEIGHT: 188 LBS

## 2019-06-11 VITALS
OXYGEN SATURATION: 98 % | WEIGHT: 188 LBS | BODY MASS INDEX: 26.32 KG/M2 | HEIGHT: 71 IN | SYSTOLIC BLOOD PRESSURE: 124 MMHG | DIASTOLIC BLOOD PRESSURE: 76 MMHG | RESPIRATION RATE: 16 BRPM | TEMPERATURE: 98 F | HEART RATE: 67 BPM

## 2019-06-11 DIAGNOSIS — Z13.31 SCREENING FOR DEPRESSION: ICD-10-CM

## 2019-06-11 DIAGNOSIS — Z71.89 ADVANCED DIRECTIVES, COUNSELING/DISCUSSION: ICD-10-CM

## 2019-06-11 DIAGNOSIS — J40 BRONCHITIS: Primary | ICD-10-CM

## 2019-06-11 DIAGNOSIS — Z00.00 MEDICARE ANNUAL WELLNESS VISIT, SUBSEQUENT: Primary | ICD-10-CM

## 2019-06-11 DIAGNOSIS — J32.9 SINUSITIS, UNSPECIFIED CHRONICITY, UNSPECIFIED LOCATION: ICD-10-CM

## 2019-06-11 RX ORDER — AZITHROMYCIN 250 MG/1
TABLET, FILM COATED ORAL
Qty: 6 TAB | Refills: 0 | Status: SHIPPED | OUTPATIENT
Start: 2019-06-11 | End: 2019-06-16

## 2019-06-11 NOTE — PATIENT INSTRUCTIONS
Bronchitis: Care Instructions  Your Care Instructions    Bronchitis is inflammation of the bronchial tubes, which carry air to the lungs. The tubes swell and produce mucus, or phlegm. The mucus and inflamed bronchial tubes make you cough. You may have trouble breathing. Most cases of bronchitis are caused by viruses like those that cause colds. Antibiotics usually do not help and they may be harmful. Bronchitis usually develops rapidly and lasts about 2 to 3 weeks in otherwise healthy people. Follow-up care is a key part of your treatment and safety. Be sure to make and go to all appointments, and call your doctor if you are having problems. It's also a good idea to know your test results and keep a list of the medicines you take. How can you care for yourself at home? · Take all medicines exactly as prescribed. Call your doctor if you think you are having a problem with your medicine. · Get some extra rest.  · Take an over-the-counter pain medicine, such as acetaminophen (Tylenol), ibuprofen (Advil, Motrin), or naproxen (Aleve) to reduce fever and relieve body aches. Read and follow all instructions on the label. · Do not take two or more pain medicines at the same time unless the doctor told you to. Many pain medicines have acetaminophen, which is Tylenol. Too much acetaminophen (Tylenol) can be harmful. · Take an over-the-counter cough medicine that contains dextromethorphan to help quiet a dry, hacking cough so that you can sleep. Avoid cough medicines that have more than one active ingredient. Read and follow all instructions on the label. · Breathe moist air from a humidifier, hot shower, or sink filled with hot water. The heat and moisture will thin mucus so you can cough it out. · Do not smoke. Smoking can make bronchitis worse. If you need help quitting, talk to your doctor about stop-smoking programs and medicines. These can increase your chances of quitting for good.   When should you call for help? Call 911 anytime you think you may need emergency care. For example, call if:    · You have severe trouble breathing.    Call your doctor now or seek immediate medical care if:    · You have new or worse trouble breathing.     · You cough up dark brown or bloody mucus (sputum).     · You have a new or higher fever.     · You have a new rash.    Watch closely for changes in your health, and be sure to contact your doctor if:    · You cough more deeply or more often, especially if you notice more mucus or a change in the color of your mucus.     · You are not getting better as expected. Where can you learn more? Go to http://maria eugenia-louis.info/. Enter H333 in the search box to learn more about \"Bronchitis: Care Instructions. \"  Current as of: September 5, 2018  Content Version: 11.9  © 1808-3548 [employer]. Care instructions adapted under license by [employer] (which disclaims liability or warranty for this information). If you have questions about a medical condition or this instruction, always ask your healthcare professional. [address] any warranty or liability for your use of this information.

## 2019-06-11 NOTE — PROGRESS NOTES
SUBJECTIVE  Chief Complaint   Patient presents with    Cold Symptoms     ongoing past 13 days no fever noted     Cough    Other     c/o jaw pain due to dental implant     Sinus Pain       The patient presents complaining of a 2 week history of cough. The cough is described as productive of green sputum. The patient does not have nasal congestion but has head congestion. The patient reports sinus pressure. The patient denies fevers. The patient denies shortness of breath, chest pain, chest tightness, or wheezing. The patient has tried OTC cough and cold remedies without significant relief. ROS:  Cardiac:  No chest pain or palpitations. GI: The patient denies any nausea or vomiting. No diarrhea or abdominal pain. OBJECTIVE    Blood pressure 124/76, pulse 67, temperature 98 °F (36.7 °C), temperature source Oral, resp. rate 16, height 5' 11\" (1.803 m), weight 188 lb (85.3 kg), SpO2 98 %. General:  Alert, cooperative, well appearing, in no apparent distress. Eyes: The lids are without swelling, lesions, or drainage. The conjunctiva is clear and noninjected. ENT:  The maxillary and frontal sinuses are tender to palpation. The nasal turbinates are engorged without drainage. The tympanic membranes and external auditory canal are normal bilaterally. The tongue and mucous membranes are pink and moist without lesions. The pharynx is non-erythematous without exudates. Neck:  supple without adenopathy. CV:  The heart sounds are regular in rate and rhythm. There is a normal S1 and S2. There or no murmurs. Lungs: Inspiratory and expiratory efforts are full and unlabored. Lung sounds are clear and equal to auscultation throughout all lung fields without rhonchi, wheezing or rales. Skin:  No rashes or jaundice. Psych: normal affect. Mood good. Oriented x 3. Judgement and insight intact. ASSESSMENT / PLAN    ICD-10-CM ICD-9-CM    1. Bronchitis J40 490    2.  Sinusitis, unspecified chronicity, unspecified location J32.9 473.9      Start z-franki. Advised to start back on Zyrtec. Monitor symptoms to resolution. We will also use OTC meds as directed. The patient was instructed to follow-up if their condition worsened or persisted. All chart history elements were reviewed by me at the time of the visit even though marked at time of note closure. Patient understands our medical plan. Patient has provided input and agrees with goals. Alternatives have been explained and offered. All questions answered. The patient is to call if condition worsens or fails to improve. RTC as needed.

## 2019-06-11 NOTE — PATIENT INSTRUCTIONS
Medicare Wellness Visit, Male    The best way to live healthy is to have a lifestyle where you eat a well-balanced diet, exercise regularly, limit alcohol use, and quit all forms of tobacco/nicotine, if applicable. Regular preventive services are another way to keep healthy. Preventive services (vaccines, screening tests, monitoring & exams) can help personalize your care plan, which helps you manage your own care. Screening tests can find health problems at the earliest stages, when they are easiest to treat. [facility] follows the current, evidence-based guidelines published by the [facility] ([facility]) when recommending preventive services for our patients. Because we follow these guidelines, sometimes recommendations change over time as research supports it. (For example, a prostate screening blood test is no longer routinely recommended for men with no symptoms.)  Of course, you and your doctor may decide to screen more often for some diseases, based on your risk and co-morbidities (chronic disease you are already diagnosed with). Preventive services for you include:  - Medicare offers their members a free annual wellness visit, which is time for you and your primary care provider to discuss and plan for your preventive service needs. Take advantage of this benefit every year!  -All adults over age 72 should receive the recommended pneumonia vaccines. Current USPSTF guidelines recommend a series of two vaccines for the best pneumonia protection.   -All adults should have a flu vaccine yearly and an ECG.  All adults age 61 and older should receive a shingles vaccine once in their lifetime.    -All adults age 38-68 who are overweight should have a diabetes screening test once every three years.   -Other screening tests & preventive services for persons with diabetes include: an eye exam to screen for diabetic retinopathy, a kidney function test, a foot exam, and stricter control over your cholesterol.   -Cardiovascular screening for adults with routine risk involves an electrocardiogram (ECG) at intervals determined by the provider.   -Colorectal cancer screening should be done for adults age 54-65 with no increased risk factors for colorectal cancer. There are a number of acceptable methods of screening for this type of cancer. Each test has its own benefits and drawbacks. Discuss with your provider what is most appropriate for you during your annual wellness visit. The different tests include: colonoscopy (considered the best screening method), a fecal occult blood test, a fecal DNA test, and sigmoidoscopy.  -All adults born between [facility] should be screened once for Hepatitis C.  -An Abdominal Aortic Aneurysm (AAA) Screening is recommended for men age 73-68 who has ever smoked in their lifetime. Here is a list of your current Health Maintenance items (your personalized list of preventive services) with a due date:  Health Maintenance Due   Topic Date Due    Glaucoma Screening   10/14/2018    Annual Well Visit  06/05/2019        Advance Directives: Care Instructions  Your Care Instructions  An advance directive is a legal way to state your wishes at the end of your life. It tells your family and your doctor what to do if you can no longer say what you want. There are two main types of advance directives. You can change them any time that your wishes change. · A living will tells your family and your doctor your wishes about life support and other treatment. · A durable power of  for health care lets you name a person to make treatment decisions for you when you can't speak for yourself. This person is called a health care agent. If you do not have an advance directive, decisions about your medical care may be made by a doctor or a  who doesn't know you.   It may help to think of an advance directive as a gift to the people who care for you. If you have one, they won't have to make tough decisions by themselves. Follow-up care is a key part of your treatment and safety. Be sure to make and go to all appointments, and call your doctor if you are having problems. It's also a good idea to know your test results and keep a list of the medicines you take. How can you care for yourself at home? · Discuss your wishes with your loved ones and your doctor. This way, there are no surprises. · Many states have a unique form. Or you might use a universal form that has been approved by many states. This kind of form can sometimes be completed and stored online. Your electronic copy will then be available wherever you have a connection to the Internet. In most cases, doctors will respect your wishes even if you have a form from a different state. · You don't need a  to do an advance directive. But you may want to get legal advice. · Think about these questions when you prepare an advance directive:  ? Who do you want to make decisions about your medical care if you are not able to? Many people choose a family member or close friend. ? Do you know enough about life support methods that might be used? If not, talk to your doctor so you understand. ? What are you most afraid of that might happen? You might be afraid of having pain, losing your independence, or being kept alive by machines. ? Where would you prefer to die? Choices include your home, a hospital, or a nursing home. ? Would you like to have information about hospice care to support you and your family? ? Do you want to donate organs when you die? ? Do you want certain [employer] practices performed before you die? If so, put your wishes in the advance directive. · Read your advance directive every year, and make changes as needed. When should you call for help? Be sure to contact your doctor if you have any questions. Where can you learn more?   Go to http://maria eugenia-louis.info/. Enter R264 in the search box to learn more about \"Advance Directives: Care Instructions. \"  Current as of: April 18, 2018  Content Version: 11.9  © 2522-7553 [employer]. Care instructions adapted under license by [employer] (which disclaims liability or warranty for this information). If you have questions about a medical condition or this instruction, always ask your healthcare professional. [place] [address] any warranty or liability for your use of this information.     Follow up in 1 year for annual wellness

## 2019-06-11 NOTE — ACP (ADVANCE CARE PLANNING)
Advance Care Planning    Advance Care Planning (ACP) Provider Note - Comprehensive     Date of ACP Conversation: 06/11/19  Persons included in Conversation:  patient  Length of ACP Conversation in minutes:  16 minutes    Authorized Decision Maker (if patient is incapable of making informed decisions): This person is:  Healthcare Agent/Medical Power of  under Advance Directive  Len Kirkpatrick, wife  Brynn Lopez, youngest daughter (pediatric GI physician). General ACP for ALL Patients with Decision Making Capacity:   Importance of advance care planning, including choosing a healthcare agent to communicate patient's healthcare decisions if patient lost the ability to make decisions, such as after a sudden illness or accident  Understanding of the healthcare agent role was assessed and information provided    Review of Existing Advance Directive:  He will bring us a copy for review.     For Serious or Chronic Illness:  No known illness    Interventions Provided:  Reviewed existing Advance Directive   Recommended communicating the plan and making copies for the healthcare agent, personal physician, and others as appropriate (e.g., health system)  Recommended review of completed ACP document annually or upon change in health status

## 2019-06-11 NOTE — PROGRESS NOTES
1. Have you been to the ER, urgent care clinic since your last visit? Hospitalized since your last visit? No    2. Have you seen or consulted any other health care providers outside of the [address] [place] [address] since your last visit? Include any pap smears or colon screening.  No

## 2019-06-11 NOTE — PROGRESS NOTES
1. Have you been to the ER, urgent care clinic since your last visit? Hospitalized since your last visit? No    2. Have you seen or consulted any other health care providers outside of the [address] [place] [address] since your last visit? Include any pap smears or colon screening. No     Abuse Screening Questionnaire 6/11/2019   Do you ever feel afraid of your partner? N   Are you in a relationship with someone who physically or mentally threatens you? N   Is it safe for you to go home? Y     Fall Risk Assessment, last 12 mths 6/11/2019   Able to walk? Yes   Fall in past 12 months?  No             3 most recent PHQ Screens 6/11/2019   Little interest or pleasure in doing things Not at all   Feeling down, depressed, irritable, or hopeless Not at all   Total Score PHQ 2 0

## 2019-06-11 NOTE — PROGRESS NOTES
Chief Complaint   Patient presents with    Annual Wellness Visit     This is the Subsequent Medicare Annual Wellness Exam, performed 12 months or more after the Initial AWV or the last Subsequent AWV    I have reviewed the patient's medical history in detail and updated the computerized patient record. History     Past Medical History:   Diagnosis Date    Agatston coronary artery calcium score less than 100 2/10/2014    Coronary calcium score of 82.  Arthritis     right shoulder, SC joint    BPH (benign prostatic hyperplasia)     has tried avodart and flomax unsuccessfully in the 90s.  ED (erectile dysfunction)     Giant cell granuloma     oral, lower inner gingiva    H/O colonoscopy 3/2005 / 03-    6mm polyp every 5 years based on pathology report    Hearing disorder, sensorineural 1/28/2014    Using aides    Hypercholesteremia     Neutropenia ([facility] [address] 2016    Sees heme/onc    Onychomycosis     toenails      Past Surgical History:   Procedure Laterality Date    HX CATARACT REMOVAL      HX ORTHOPAEDIC Right 1980    plantar fasciitis, neurofibroma?  HX OTHER SURGICAL  2008    peripheral giant cell granuloma removal     HX TURP  2009    for BPH     HX VASECTOMY  1984     Current Outpatient Medications   Medication Sig Dispense Refill    tadalafil (CIALIS) 5 mg tablet Take 1 Tab by mouth daily. 180 Tab 1    cetirizine (ZYRTEC) 10 mg tablet Take 10 mg by mouth two (2) times a day.  KRILL OIL PO Take 1 capsule by mouth daily.  GLUCOSAMINE/CHONDROITIN SULF A (GLUCOSAMINE-CHONDROITIN PO) Take 1,500 mg by mouth daily.  BENEFIBER, GUAR GUM, PO Take  by mouth. 1 tbsp daily      multivitamin (ONE A DAY) tablet Take 1 Tab by mouth daily.  aspirin delayed-release 81 mg tablet Take 81 mg by mouth daily.  Cholecalciferol, Vitamin D3, (VITAMIN D3) 1,000 unit cap Take 2 tablets by mouth daily.  ibuprofen (MOTRIN IB) 200 mg tablet Take 400 mg by mouth as needed for Pain. Allergies   Allergen Reactions    Benzocaine Swelling    Codeine Nausea Only     Family History   Problem Relation Age of Onset    Depression Mother     Alcohol abuse Mother     Other Mother         tobacco use    Breast Cancer Mother    [facility] [place] Arthritis-osteo Mother     Cancer Mother 80        skin/pancreatic     Alcohol abuse Brother     Other Brother         tobacco use    Other Father         tobacco use    Hypertension Father     Arthritis-osteo Father     Lung Cancer Father      Social History     Tobacco Use    Smoking status: Never Smoker    Smokeless tobacco: Never Used   Substance Use Topics    Alcohol use: Yes     Alcohol/week: 1.0 oz     Types: 2 Standard drinks or equivalent per week     Patient Active Problem List   Diagnosis Code    BPH (benign prostatic hypertrophy) N40.0    Hearing disorder, sensorineural H90.5    Erectile dysfunction N52.9    Advanced care planning/counseling discussion Z71.89       Depression Risk Factor Screening:     3 most recent PHQ Screens 6/11/2019   Little interest or pleasure in doing things Not at all   Feeling down, depressed, irritable, or hopeless Not at all   Total Score PHQ 2 0     Alcohol Risk Factor Screening: You do not drink alcohol or very rarely. Functional Ability and Level of Safety:   Hearing Loss  The patient wears hearing aids. Sees [employer].  Wears bilaterally. Wife still complaints about hearing. Activities of Daily Living  The home contains: no safety equipment. Patient does total self care    Fall Risk  Fall Risk Assessment, last 12 mths 6/11/2019   Able to walk? Yes   Fall in past 12 months?  No       Abuse Screen  Patient is not abused    Cognitive Screening   Evaluation of Cognitive Function:  Has your family/caregiver stated any concerns about your memory: no  Normal    Patient Care Team   Patient Care Team:  Nickie Shah MD as PCP - General (Family Practice)  Louise Montes MD (Dermatology)  Bettina Haider MD (Ophthalmology)  Ashutosh Guadalupe MD (Oral Surgery)  Magdalena Raza MD (Gastroenterology)  January Pisano DO (Hematology and Oncology)    Assessment/Plan   Education and counseling provided:  Are appropriate based on today's review and evaluation      ICD-10-CM ICD-9-CM    1. Medicare annual wellness visit, subsequent Z00.00 V70.0    2. Advanced directives, counseling/discussion Z71.89 V65.49 ADVANCE CARE PLANNING FIRST 30 MINS   3. Screening for depression Z13.31 V79.0 DEPRESSION SCREEN ANNUAL     Age and sex specific counseling. Screening for depression and alcoholism. Advanced directives / end of life planning discussion - see ACP note. Care team updated. Vaccines are up to date. Medicare recommended screening and prevention test table is filled out, reviewed, and provided to patient. Screening and prevention is up to date. Patient understands our medical plan. Patient has provided input and agrees with goals. All questions answered.

## 2020-05-26 ENCOUNTER — TELEPHONE (OUTPATIENT)
Dept: FAMILY MEDICINE CLINIC | Age: 70
End: 2020-05-26

## 2020-05-26 DIAGNOSIS — Z12.5 PROSTATE CANCER SCREENING: Primary | ICD-10-CM

## 2020-05-26 NOTE — TELEPHONE ENCOUNTER
Pt would like to know if he need to have lab work done before his appt on 6/11/2020. He is only seeing his oncologist once a year so he would like the labs that is usually done there done if needed. Please advise.

## 2020-06-01 ENCOUNTER — HOSPITAL ENCOUNTER (OUTPATIENT)
Dept: LAB | Age: 70
Discharge: HOME | End: 2020-06-01
Payer: COMMERCIAL

## 2020-06-01 DIAGNOSIS — Z12.5 PROSTATE CANCER SCREENING: ICD-10-CM

## 2020-06-01 LAB — PSA SERPL-MCNC: 0.4 NG/ML (ref 0–4)

## 2020-06-01 PROCEDURE — 84153 ASSAY OF PSA TOTAL: CPT

## 2020-06-01 PROCEDURE — 36415 COLL VENOUS BLD VENIPUNCTURE: CPT

## 2020-06-11 ENCOUNTER — VIRTUAL VISIT (OUTPATIENT)
Dept: FAMILY MEDICINE CLINIC | Age: 70
End: 2020-06-11

## 2020-06-11 DIAGNOSIS — Z13.31 SCREENING FOR DEPRESSION: ICD-10-CM

## 2020-06-11 DIAGNOSIS — Z00.00 MEDICARE ANNUAL WELLNESS VISIT, SUBSEQUENT: Primary | ICD-10-CM

## 2020-06-11 DIAGNOSIS — Z71.89 ADVANCED DIRECTIVES, COUNSELING/DISCUSSION: ICD-10-CM

## 2020-06-11 DIAGNOSIS — Z12.5 PROSTATE CANCER SCREENING: ICD-10-CM

## 2020-06-11 RX ORDER — TADALAFIL 5 MG/1
5 TABLET ORAL DAILY
Qty: 180 TAB | Refills: 1 | Status: SHIPPED | OUTPATIENT
Start: 2020-06-11 | End: 2021-11-03 | Stop reason: SDUPTHER

## 2020-06-11 NOTE — PROGRESS NOTES
Chief Complaint   Patient presents with    Annual Wellness Visit     This is the Subsequent Medicare Annual Wellness Exam, performed 12 months or more after the Initial AWV or the last Subsequent AWV    Consent: Luis Angel Lopez, who was seen by synchronous (real-time) audio only technology, and/or his healthcare decision maker, is aware that this patient-initiated, Telehealth encounter on 6/11/2020 is a billable service. While AWVs are fully covered by Medicare, any services rendered on this date that are not included in an AWV are subject to additional billing, with coverage as determined by his insurance carrier. He is aware that he may receive a bill for any such additional services and has provided verbal consent to proceed: Yes. I have reviewed the patient's medical history in detail and updated the computerized patient record. History     Patient Active Problem List   Diagnosis Code    BPH (benign prostatic hypertrophy) N40.0    Hearing disorder, sensorineural H90.5    Erectile dysfunction N52.9    Advanced care planning/counseling discussion Z71.89     Past Medical History:   Diagnosis Date    Agatston coronary artery calcium score less than 100 2/10/2014    Coronary calcium score of 82.  Arthritis     right shoulder, SC joint, knees    BPH (benign prostatic hyperplasia)     has tried avodart and flomax unsuccessfully in the 90s.  ED (erectile dysfunction)     Giant cell granuloma     oral, lower inner gingiva    H/O colonoscopy 3/2005 / 03-    6mm polyp every 5 years based on pathology report    Hearing disorder, sensorineural 1/28/2014    Using aides    Hypercholesteremia     Neutropenia ([facility] [address] 2016    Sees heme/onc    Onychomycosis     toenails      Past Surgical History:   Procedure Laterality Date    HX CATARACT REMOVAL      HX ORTHOPAEDIC Right 1980    plantar fasciitis, neurofibroma?     HX OTHER SURGICAL  2008    peripheral giant cell granuloma removal     HX TURP  2009 for BPH     HX VASECTOMY  1984     Current Outpatient Medications   Medication Sig Dispense Refill    tadalafil (CIALIS) 5 mg tablet Take 1 Tab by mouth daily. 180 Tab 1    cetirizine (ZYRTEC) 10 mg tablet Take 10 mg by mouth two (2) times a day.  ibuprofen (MOTRIN IB) 200 mg tablet Take 400 mg by mouth as needed for Pain.  KRILL OIL PO Take 1 capsule by mouth daily.  GLUCOSAMINE/CHONDROITIN SULF A (GLUCOSAMINE-CHONDROITIN PO) Take 1,500 mg by mouth daily.  BENEFIBER, GUAR GUM, PO Take  by mouth. 1 tbsp daily      multivitamin (ONE A DAY) tablet Take 1 Tab by mouth daily.  aspirin delayed-release 81 mg tablet Take 81 mg by mouth daily.  Cholecalciferol, Vitamin D3, (VITAMIN D3) 1,000 unit cap Take 2 tablets by mouth daily. Allergies   Allergen Reactions    Benzocaine Swelling    Codeine Nausea Only       Family History   Problem Relation Age of Onset    Depression Mother     Alcohol abuse Mother     Other Mother         tobacco use    Breast Cancer Mother    [facility] Arthritis-osteo Mother     Cancer Mother 80        skin/pancreatic     Alcohol abuse Brother     Other Brother         tobacco use    Other Father         tobacco use    Hypertension Father     Arthritis-osteo Father     Lung Cancer Father      Social History     Tobacco Use    Smoking status: Never Smoker    Smokeless tobacco: Never Used   Substance Use Topics    Alcohol use: Yes     Alcohol/week: 1.7 standard drinks     Types: 2 Standard drinks or equivalent per week     Frequency: 2-4 times a month     Drinks per session: 1 or 2     Binge frequency: Never       Depression Risk Factor Screening:     3 most recent PHQ Screens 6/11/2019   Little interest or pleasure in doing things Not at all   Feeling down, depressed, irritable, or hopeless Not at all   Total Score PHQ 2 0       Alcohol Risk Factor Screening (MALE > 65):    Do you average more 1 drink per night or more than 7 drinks a week: No    In the past three months have you have had more than 4 drinks containing alcohol on one occasion: No      Functional Ability and Level of Safety:   Hearing: Hearing is good. Activities of Daily Living: The home contains: no safety equipment. Patient does total self care    Ambulation: with no difficulty besides knee pain but he is scheduled with ortho    Fall Risk:  Fall Risk Assessment, last 12 mths 6/11/2019   Able to walk? Yes   Fall in past 12 months? No       Abuse Screen:  Patient is not abused    Cognitive Screening   Has your family/caregiver stated any concerns about your memory: no    Patient Care Team   Patient Care Team:  Aubrey Pollock MD as PCP - General (Family Practice)  Aubrey Pollock MD as PCP - [facility]  Jazmyne Galarza MD (Dermatology)  Halley Martinez MD (Ophthalmology)  Randy Mccauley MD (Oral Surgery)  [place] MD (Gastroenterology)  Kimberley Ibrahim DO (Hematology and Oncology)  Jovan Gomes MD (Orthopedic Surgery)    Assessment/Plan   Education and counseling provided:  Are appropriate based on today's review and evaluation      ICD-10-CM ICD-9-CM    1. Medicare annual wellness visit, subsequent Z00.00 V70.0    2. Advanced directives, counseling/discussion Z71.89 V65.49 ADVANCE CARE PLANNING FIRST 30 MINS   3. Screening for depression Z13.31 V79.0 DEPRESSION SCREEN ANNUAL   4. Prostate cancer screening Z12.5 V76.44 tadalafiL (Cialis) 5 mg tablet       Age and sex specific counseling. Screening for depression and alcoholism. Advanced directives / end of life planning discussion see ACP note. Care team updated. Vaccines are up to date. Medicare recommended screening and prevention test table is filled out, reviewed, and provided to patient via [address] Due for colonoscopy but he is waiting out the pandemic and going to reschedule his colonoscopy that is scheduled in a few weeks. He is keeping up with oncology annually.   Considering seeing plastic surgeon for growth on finger - I encouraged him to do so. Patient understands our medical plan. Patient has provided input and agrees with goals. All questions answered. Diagnoses and all orders for this visit:    1. Medicare annual wellness visit, subsequent    2. Advanced directives, counseling/discussion  -     ADVANCE CARE PLANNING FIRST 30 MINS    3. Screening for depression  -     [place] Maintenance Due   Topic Date Due    Colonoscopy  03/11/2020    Medicare Yearly Exam  06/11/2020       Corona Busby is a 79 y.o. male who was evaluated by an audio only encounter for concerns as above. Patient identification was verified prior to start of the visit. A caregiver was present when appropriate. Due to this being a TeleHealth encounter (During [facility] public health emergency), evaluation of the following organ systems was limited: Vitals/Constitutional/EENT/Resp/CV/GI//MS/Neuro/Skin/Heme-Lymph-Imm. Pursuant to the emergency declaration under the [facility] waiver authority and the [employer] Act, this Virtual Visit was conducted, with patient's (and/or legal guardian's) consent, to reduce the patient's risk of exposure to COVID-19 and provide necessary medical care. Services were provided through a synchronous discussion virtually to substitute for in-person clinic visit. I was at home. The patient was at home.       Hazel Toth MD

## 2020-06-11 NOTE — ACP (ADVANCE CARE PLANNING)
Advance Care Planning    Advance Care Planning (ACP) Provider Note - Comprehensive     Date of ACP Conversation: 06/11/20  Persons included in Conversation:  patient  Length of ACP Conversation in minutes:  16 minutes    Authorized Decision Maker (if patient is incapable of making informed decisions): This person is:  Healthcare Agent/Medical Power of  under Advance Directive  Saba Beth, wife  Wan Conley, youngest daughter (pediatric GI physician). General ACP for ALL Patients with Decision Making Capacity:   Importance of advance care planning, including choosing a healthcare agent to communicate patient's healthcare decisions if patient lost the ability to make decisions, such as after a sudden illness or accident  Understanding of the healthcare agent role was assessed and information provided    Review of Existing Advance Directive:  He will complete new forms and hand them in.     For Serious or Chronic Illness:  No known illness    Interventions Provided:  Reviewed existing Advance Directive   Recommended communicating the plan and making copies for the healthcare agent, personal physician, and others as appropriate (e.g., health system)  Recommended review of completed ACP document annually or upon change in health status      Advance Care Planning

## 2020-06-11 NOTE — PATIENT INSTRUCTIONS
Medicare Wellness Visit, Male The best way to live healthy is to have a lifestyle where you eat a well-balanced diet, exercise regularly, limit alcohol use, and quit all forms of tobacco/nicotine, if applicable. Regular preventive services are another way to keep healthy. Preventive services (vaccines, screening tests, monitoring & exams) can help personalize your care plan, which helps you manage your own care. Screening tests can find health problems at the earliest stages, when they are easiest to treat. [facility] follows the current, evidence-based guidelines published by the [facility] ([facility]) when recommending preventive services for our patients. Because we follow these guidelines, sometimes recommendations change over time as research supports it. (For example, a prostate screening blood test is no longer routinely recommended for men with no symptoms). Of course, you and your doctor may decide to screen more often for some diseases, based on your risk and co-morbidities (chronic disease you are already diagnosed with). Preventive services for you include: - Medicare offers their members a free annual wellness visit, which is time for you and your primary care provider to discuss and plan for your preventive service needs. Take advantage of this benefit every year! 
-All adults over age 72 should receive the recommended pneumonia vaccines. Current USPSTF guidelines recommend a series of two vaccines for the best pneumonia protection.  
-All adults should have a flu vaccine yearly and tetanus vaccine every 10 years. 
-All adults age 48 and older should receive the shingles vaccines (series of two vaccines).       
-All adults age 38-68 who are overweight should have a diabetes screening test once every three years.  
-Other screening tests & preventive services for persons with diabetes include: an eye exam to screen for diabetic retinopathy, a kidney function test, a foot exam, and stricter control over your cholesterol.  
-Cardiovascular screening for adults with routine risk involves an electrocardiogram (ECG) at intervals determined by the provider.  
-Colorectal cancer screening should be done for adults age 54-65 with no increased risk factors for colorectal cancer. There are a number of acceptable methods of screening for this type of cancer. Each test has its own benefits and drawbacks. Discuss with your provider what is most appropriate for you during your annual wellness visit. The different tests include: colonoscopy (considered the best screening method), a fecal occult blood test, a fecal DNA test, and sigmoidoscopy. 
-All adults born between [facility] should be screened once for Hepatitis C. 
-An Abdominal Aortic Aneurysm (AAA) Screening is recommended for men age 73-68 who has ever smoked in their lifetime. Here is a list of your current Health Maintenance items (your personalized list of preventive services) with a due date: 
Health Maintenance Due Topic Date Due  
 Colonoscopy  03/11/2020 [facility] Annual Well Visit  06/11/2020

## 2021-05-19 ENCOUNTER — OFFICE VISIT (OUTPATIENT)
Dept: FAMILY MEDICINE CLINIC | Age: 71
End: 2021-05-19

## 2021-05-19 VITALS
WEIGHT: 179 LBS | HEIGHT: 71 IN | BODY MASS INDEX: 25.06 KG/M2 | RESPIRATION RATE: 14 BRPM | SYSTOLIC BLOOD PRESSURE: 130 MMHG | HEART RATE: 58 BPM | TEMPERATURE: 97.8 F | DIASTOLIC BLOOD PRESSURE: 72 MMHG | OXYGEN SATURATION: 97 %

## 2021-05-19 NOTE — PROGRESS NOTES
No services rendered. He has no complaints but needs to review cardiology notes when he was in [place]. He was diagnosed with an abn EKG - had incomplete RBBB. No cardiac symptoms reports. Saw cardiology as a result of an eye surgery that was needed after an urgent care pre-op. He is here for his annual physical but he is not due until June. He will come back in June.

## 2021-05-19 NOTE — PROGRESS NOTES
Chief Complaint   Patient presents with   [facility] Annual Wellness Visit     3 most recent PHQ Screens 5/19/2021   Little interest or pleasure in doing things Not at all   Feeling down, depressed, irritable, or hopeless Not at all   Total Score PHQ 2 0     Abuse Screening Questionnaire 5/19/2021   Do you ever feel afraid of your partner? N   Are you in a relationship with someone who physically or mentally threatens you? N   Is it safe for you to go home? Y     Fall Risk Assessment, last 12 mths 5/19/2021   Able to walk? Yes   Fall in past 12 months? 0   Do you feel unsteady? 0   Are you worried about falling 0     1. Have you been to the ER, urgent care clinic since your last visit? Hospitalized since your last visit? [facility]     2. Have you seen or consulted any other health care providers outside of the [address] [place] [address] since your last visit? Include any pap smears or colon screening. Yes, [facility] and cardiology in [place] (Feb 2021)      [id number] [facility] requesting office note from 2/2021.

## 2021-06-24 ENCOUNTER — OFFICE VISIT (OUTPATIENT)
Dept: FAMILY MEDICINE CLINIC | Age: 71
End: 2021-06-24
Payer: MEDICARE

## 2021-06-24 VITALS
TEMPERATURE: 98.2 F | SYSTOLIC BLOOD PRESSURE: 110 MMHG | BODY MASS INDEX: 24.64 KG/M2 | OXYGEN SATURATION: 97 % | HEART RATE: 54 BPM | DIASTOLIC BLOOD PRESSURE: 70 MMHG | WEIGHT: 176 LBS | HEIGHT: 71 IN | RESPIRATION RATE: 14 BRPM

## 2021-06-24 DIAGNOSIS — R25.1 TREMOR: ICD-10-CM

## 2021-06-24 DIAGNOSIS — Z71.89 ADVANCED DIRECTIVES, COUNSELING/DISCUSSION: ICD-10-CM

## 2021-06-24 DIAGNOSIS — Z12.5 PROSTATE CANCER SCREENING: ICD-10-CM

## 2021-06-24 DIAGNOSIS — Z00.00 MEDICARE ANNUAL WELLNESS VISIT, SUBSEQUENT: Primary | ICD-10-CM

## 2021-06-24 PROCEDURE — G8536 NO DOC ELDER MAL SCRN: HCPCS | Performed by: FAMILY MEDICINE

## 2021-06-24 PROCEDURE — G8510 SCR DEP NEG, NO PLAN REQD: HCPCS | Performed by: FAMILY MEDICINE

## 2021-06-24 PROCEDURE — G0439 PPPS, SUBSEQ VISIT: HCPCS | Performed by: FAMILY MEDICINE

## 2021-06-24 PROCEDURE — G8427 DOCREV CUR MEDS BY ELIG CLIN: HCPCS | Performed by: FAMILY MEDICINE

## 2021-06-24 PROCEDURE — G8420 CALC BMI NORM PARAMETERS: HCPCS | Performed by: FAMILY MEDICINE

## 2021-06-24 PROCEDURE — 1101F PT FALLS ASSESS-DOCD LE1/YR: CPT | Performed by: FAMILY MEDICINE

## 2021-06-24 PROCEDURE — 3017F COLORECTAL CA SCREEN DOC REV: CPT | Performed by: FAMILY MEDICINE

## 2021-06-24 NOTE — PROGRESS NOTES
1. Have you been to the ER, urgent care clinic since your last visit? Hospitalized since your last visit? No    2. Have you seen or consulted any other health care providers outside of the [address] [place] [address] since your last visit? Include any pap smears or colon screening.  Yes, ophthalmology in [place]

## 2021-06-24 NOTE — PATIENT INSTRUCTIONS
Benign Essential Tremor: Care Instructions  Your Care Instructions     Benign essential tremor is a medical term for shaking that you can't control. Your hand or fingers may shake when you lift a cup or point at something. Or your voice may shake when you speak. This type of tremor is not harmful. It is not caused by a stroke or Parkinson's disease. Some things can affect how much you shake. For example, drinking or eating something with caffeine may make tremors worse for a while. Some medicines also can increase tremors. These include antidepressants and too much thyroid replacement. Talk to your doctor if you think one of your medicines makes your tremors worse. If you are self-conscious about your tremors, there are some things you can do to reduce them or make them less noticeable. This includes taking medicine. Follow-up care is a key part of your treatment and safety. Be sure to make and go to all appointments, and call your doctor if you are having problems. It's also a good idea to know your test results and keep a list of the medicines you take. How can you care for yourself at home? · Take your medicines exactly as prescribed. Call your doctor if you think you are having a problem with your medicine. Some medicines that help control tremors have to be taken every day, even if you are not having tremors. You will get more details on the specific medicines your doctor prescribes. · Get plenty of rest.  · Eat a balanced, healthy diet. · Try to reduce stress. Regular exercise and massages may help. · Limit alcohol. Heavy drinking can make your tremors worse. · Avoid drinks or foods with caffeine if they make your tremors worse. These include tea, cola, coffee, and chocolate. · Wear a heavy bracelet or watch. This adds a little weight to your hand. The extra weight may reduce tremors. · Drink from cups or glasses that are only half full. You may also want to try drinking with a straw.   When should you call for help? Watch closely for changes in your health, and be sure to contact your doctor if:    · You notice your tremors are getting worse.     · You can't do your everyday activities because of your tremors.     · You are sad and embarrassed about your shaking. Where can you learn more? Go to http://www.gray.com/  Enter B746 in the search box to learn more about \"Benign Essential Tremor: Care Instructions. \"  Current as of: August 4, 2020               Content Version: 12.8  © 9006-1615 [employer]. Care instructions adapted under license by [employer] (which disclaims liability or warranty for this information). If you have questions about a medical condition or this instruction, always ask your healthcare professional. [address] any warranty or liability for your use of this information.

## 2021-06-24 NOTE — PROGRESS NOTES
CC:  Patient presents for AWV\"    This is the Subsequent Medicare Annual Wellness Exam, performed 12 months or more after the Initial AWV or the last Subsequent AWV    I have reviewed the patient's medical history in detail and updated the computerized patient record. History     Patient Active Problem List   Diagnosis Code    BPH (benign prostatic hypertrophy) N40.0    Hearing disorder, sensorineural H90.5    Erectile dysfunction N52.9    Advanced care planning/counseling discussion Z71.89     Past Medical History:   Diagnosis Date    AgaRUST coronary artery calcium score less than 100 2/10/2014    Coronary calcium score of 82.  Arthritis     right shoulder, SC joint, knees    BPH (benign prostatic hyperplasia)     has tried avodart and flomax unsuccessfully in the 90s.  ED (erectile dysfunction)     Giant cell granuloma     oral, lower inner gingiva    H/O colonoscopy 3/2005 / 03- / 9/2020    6mm polyp every 5 years based on pathology report    Hearing disorder, sensorineural 1/28/2014    Using aides    History of colonoscopy with polypectomy 09/02/2020    Dr. Staci Bah; internal hemorrhoids, mild diverticulosis,polyp(adenoma); f/u colonoscopy in 5 years    Hypercholesteremia     Incomplete RBBB     Neutropenia ([facility]) 2016    Sees heme/onc    Onychomycosis     toenails    Pseudoexfoliation (PXF) glaucoma of both eyes 2021      Past Surgical History:   Procedure Laterality Date    HX CATARACT REMOVAL      HX ORTHOPAEDIC Right 1980    plantar fasciitis, neurofibroma?  HX OTHER SURGICAL  2008    peripheral giant cell granuloma removal     HX OTHER SURGICAL Right 02/15/2021    eyesurgery for dislocated intra-ocular lens    HX TURP  2009    for BPH     HX VASECTOMY  1984     Current Outpatient Medications   Medication Sig Dispense Refill    ascorbic acid (VITAMIN C PO) Take 1 Tablet by mouth daily.  CALCIUM ACETATE PO Take 1 Tablet by mouth daily.       tadalafiL (Cialis) 5 mg tablet Take 1 Tab by mouth daily. 180 Tab 1    cetirizine (ZYRTEC) 10 mg tablet Take 10 mg by mouth daily as needed.  ibuprofen (MOTRIN IB) 200 mg tablet Take 400 mg by mouth as needed for Pain.  KRILL OIL PO Take 1 capsule by mouth daily.  GLUCOSAMINE/CHONDROITIN SULF A (GLUCOSAMINE-CHONDROITIN PO) Take 1,500 mg by mouth daily.  BENEFIBER, GUAR GUM, PO Take  by mouth. 1 tbsp daily      multivitamin (ONE A DAY) tablet Take 1 Tab by mouth daily.  Cholecalciferol, Vitamin D3, (VITAMIN D3) 1,000 unit cap Take 1 Tablet by mouth daily. Allergies   Allergen Reactions    Benzocaine Swelling    Codeine Nausea Only       Family History   Problem Relation Age of Onset    Depression Mother     Alcohol abuse Mother     Other Mother         tobacco use    Breast Cancer Mother    [place] Arthritis-osteo Mother     Cancer Mother 80        skin/pancreatic     Alcohol abuse Brother     Other Brother         tobacco use    Other Father         tobacco use    Hypertension Father     Arthritis-osteo Father     Lung Cancer Father      Social History     Tobacco Use    Smoking status: Never Smoker    Smokeless tobacco: Never Used   Substance Use Topics    Alcohol use: Yes     Alcohol/week: 1.7 standard drinks     Types: 2 Standard drinks or equivalent per week       Depression Risk Factor Screening:     3 most recent PHQ Screens 6/24/2021   Little interest or pleasure in doing things Not at all   Feeling down, depressed, irritable, or hopeless Not at all   Total Score PHQ 2 0       Alcohol Risk Factor Screening (MALE > 65): Do you average more 1 drink per night or more than 7 drinks a week: No    In the past three months have you have had more than 4 drinks containing alcohol on one occasion: No      Functional Ability and Level of Safety:   Hearing: Hearing is good. Activities of Daily Living: The home contains: no safety equipment.   Patient does total self care    Ambulation: with no difficulty besides knee pain but he is scheduled with ortho    Fall Risk:  Fall Risk Assessment, last 12 mths 6/24/2021   Able to walk? Yes   Fall in past 12 months? 0   Do you feel unsteady? 0   Are you worried about falling 0       Abuse Screen:  Patient is not abused    Cognitive Screening   Has your family/caregiver stated any concerns about your memory: no    Patient Care Team   Patient Care Team:  Caro Fernando MD as PCP - General (Family Medicine)  Caro Fernando MD as PCP [facility] [place] [place] [facility]  Tara Arnold MD (Dermatology)  Iona Rahman MD (Ophthalmology)  Mariela Cunha MD (Oral Surgery)  Ariane Crockett MD (Gastroenterology)  Phillip Joe DO (Hematology and Oncology)  Shahab Christy MD (Orthopedic Surgery)    Assessment/Plan   Education and counseling provided:  Are appropriate based on today's review and evaluation      ICD-10-CM ICD-9-CM    1. Medicare annual wellness visit, subsequent  Z00.00 V70.0    2. Advanced directives, counseling/discussion  Z71.89 V65.49    3. Tremor  R25.1 781.0    4. Prostate cancer screening  Z12.5 V76.44 PSA SCREENING (SCREENING)     Age and sex specific counseling. Screening for depression and alcoholism. Advanced directives / end of life planning discussion. ACP on file. Care team updated. Patient due for Tdap and will inquire at pharmacy due to Medicare coverage concerns. Medicare recommended screening and prevention test table is filled out, reviewed, and provided to patient. He is keeping up with oncology annually. Tremor - mentions not at rest, only with activity like writing or pouring a drink. No balance issues and no memory issues. No depression. He will monitor as this is likely a benign essential tremor. Advised on his low heart rate and signs and symptoms of sick sinus. If he develops those, he will call for cardiology consultation.   He had one recently for an incomplete RBBB and was cleared by cardiology with no follow-up recommendations. Patient understands our medical plan. Patient has provided input and agrees with goals. All questions answered. RTC yearly and as needed.

## 2021-10-07 ENCOUNTER — OFFICE VISIT (OUTPATIENT)
Dept: ORTHOPEDIC SURGERY | Age: 71
End: 2021-10-07
Payer: MEDICARE

## 2021-10-07 VITALS
HEIGHT: 71 IN | BODY MASS INDEX: 25.15 KG/M2 | WEIGHT: 179.6 LBS | OXYGEN SATURATION: 96 % | HEART RATE: 60 BPM | TEMPERATURE: 96.9 F

## 2021-10-07 DIAGNOSIS — G89.29 CHRONIC PAIN OF LEFT KNEE: ICD-10-CM

## 2021-10-07 DIAGNOSIS — M17.10 PATELLOFEMORAL ARTHRITIS: Primary | ICD-10-CM

## 2021-10-07 DIAGNOSIS — M25.561 CHRONIC PAIN OF RIGHT KNEE: ICD-10-CM

## 2021-10-07 DIAGNOSIS — M25.562 CHRONIC PAIN OF LEFT KNEE: ICD-10-CM

## 2021-10-07 DIAGNOSIS — G89.29 CHRONIC PAIN OF RIGHT KNEE: ICD-10-CM

## 2021-10-07 PROCEDURE — G8432 DEP SCR NOT DOC, RNG: HCPCS | Performed by: ORTHOPAEDIC SURGERY

## 2021-10-07 PROCEDURE — 99203 OFFICE O/P NEW LOW 30 MIN: CPT | Performed by: ORTHOPAEDIC SURGERY

## 2021-10-07 PROCEDURE — G8427 DOCREV CUR MEDS BY ELIG CLIN: HCPCS | Performed by: ORTHOPAEDIC SURGERY

## 2021-10-07 PROCEDURE — 1101F PT FALLS ASSESS-DOCD LE1/YR: CPT | Performed by: ORTHOPAEDIC SURGERY

## 2021-10-07 PROCEDURE — G8419 CALC BMI OUT NRM PARAM NOF/U: HCPCS | Performed by: ORTHOPAEDIC SURGERY

## 2021-10-07 PROCEDURE — G8536 NO DOC ELDER MAL SCRN: HCPCS | Performed by: ORTHOPAEDIC SURGERY

## 2021-10-07 PROCEDURE — 20611 DRAIN/INJ JOINT/BURSA W/US: CPT | Performed by: ORTHOPAEDIC SURGERY

## 2021-10-07 PROCEDURE — 73560 X-RAY EXAM OF KNEE 1 OR 2: CPT | Performed by: ORTHOPAEDIC SURGERY

## 2021-10-07 PROCEDURE — 3017F COLORECTAL CA SCREEN DOC REV: CPT | Performed by: ORTHOPAEDIC SURGERY

## 2021-10-07 RX ORDER — BRIMONIDINE TARTRATE, TIMOLOL MALEATE 2; 5 MG/ML; MG/ML
1 SOLUTION/ DROPS OPHTHALMIC EVERY 12 HOURS
COMMUNITY

## 2021-10-07 RX ORDER — LATANOPROST 50 UG/ML
SOLUTION/ DROPS OPHTHALMIC
COMMUNITY
Start: 2021-07-28

## 2021-10-07 RX ORDER — KETOROLAC TROMETHAMINE 5 MG/ML
SOLUTION OPHTHALMIC
COMMUNITY
Start: 2021-07-26 | End: 2022-03-31

## 2021-10-07 RX ORDER — TRIAMCINOLONE ACETONIDE 40 MG/ML
40 INJECTION, SUSPENSION INTRA-ARTICULAR; INTRAMUSCULAR ONCE
Status: COMPLETED | OUTPATIENT
Start: 2021-10-07 | End: 2021-10-07

## 2021-10-07 RX ADMIN — TRIAMCINOLONE ACETONIDE 40 MG: 40 INJECTION, SUSPENSION INTRA-ARTICULAR; INTRAMUSCULAR at 09:37

## 2021-10-07 NOTE — PROGRESS NOTES
Brittny Chen  1950   Chief Complaint   Patient presents with    Knee Pain     bilateral, NKI, giving out, right worse then right        HISTORY OF PRESENT ILLNESS  Brittny Chen is a 70 y.o. male who presents today for evaluation of b/l knee. R>L He rates his pain 3/10 today. Pain has been present since June 2021. Has periods of the knee giving out and collapsing. Pain in located centrally and has some tightness on the top of the knee. Has difficulties with stairs. Pt does swim often as a form of exercise. Has been limping and compensating for the pain in the right knee. Describes stiffness in the morning. Patient describes the pain as sharp that is Intermittent in nature. Symptoms are worse with walking and is better with  nothing. Associated symptoms include nothing. Since problem started, it: has worsened. Pain does not wake patient up at night. Has taken oral steriods for the problem. Has tried following treatments: Injections:NO; Brace:NO; Therapy:NO; Cane/Crutch:NO       Allergies   Allergen Reactions    Benzocaine Swelling    Codeine Nausea Only        Past Medical History:   Diagnosis Date    [facility]    Coronary calcium score of 82.  Arthritis     right shoulder, SC joint, knees    Bilateral knee pain     BPH (benign prostatic hyperplasia)     has tried avodart and flomax unsuccessfully in the 90s.     ED (erectile dysfunction)     Giant cell granuloma     oral, lower inner gingiva    H/O colonoscopy 3/2005 / 03- / 9/2020    6mm polyp every 5 years based on pathology report    Hearing disorder, sensorineural 1/28/2014    Using aides    History of colonoscopy with polypectomy 09/02/2020    Dr. Patric Dance; internal hemorrhoids, mild diverticulosis,polyp(adenoma); f/u colonoscopy in 5 years    Hypercholesteremia     Incomplete RBBB     Neutropenia ([address] 2016    Sees heme/onc    Onychomycosis     toenails    Pseudoexfoliation (PXF) glaucoma of both eyes 2021      Social History     Socioeconomic History    Marital status:      Spouse name: Not on file    Number of children: Not on file    Years of education: Not on file    Highest education level: Not on file   Occupational History    Occupation: retired      Employer: RETIRED   Tobacco Use    Smoking status: Never Smoker    Smokeless tobacco: Never Used   Substance and Sexual Activity    Alcohol use: Yes     Alcohol/week: 1.7 standard drinks     Types: 2 Standard drinks or equivalent per week    Drug use: No    Sexual activity: Yes     Partners: Female   Other Topics Concern    Not on file   Social History Narrative    Not on file     Social Determinants of Health     Financial Resource Strain:     Difficulty of Paying Living Expenses:    Food Insecurity:     Worried About Running Out of Food in the Last Year:     [address] in the Last Year:    Transportation Needs:     Lack of Transportation (Medical):  Lack of Transportation (Non-Medical):    Physical Activity:     Days of Exercise per Week:     Minutes of Exercise per Session:    Stress:     Feeling of Stress :    Social Connections:     Frequency of Communication with Friends and Family:     Frequency of Social Gatherings with Friends and Family:     Attends Jehovah's witness Services:     Active Member of Clubs or Organizations:     Attends Club or Organization Meetings:     Marital Status:    Intimate Partner Violence:     Fear of Current or Ex-Partner:     Emotionally Abused:     Physically Abused:     Sexually Abused:       Past Surgical History:   Procedure Laterality Date    HX CATARACT REMOVAL      HX ORTHOPAEDIC Right 1980    plantar fasciitis, neurofibroma?     HX OTHER SURGICAL  2008    peripheral giant cell granuloma removal     HX OTHER SURGICAL Right 02/15/2021    eyesurgery for dislocated intra-ocular lens    HX REFRACTIVE SURGERY Right 02/15/2021    HX TURP  2009 for BPH     HX VASECTOMY  1984      Family History   Problem Relation Age of Onset    Depression Mother     Alcohol abuse Mother     Other Mother         tobacco use    Breast Cancer Mother    [place] Mother     Cancer Mother 80        skin/pancreatic     Alcohol abuse Brother     Other Brother         tobacco use    Other Father         tobacco use    Hypertension Father     Arthritis-osteo Father     Lung Cancer Father       Current Outpatient Medications   Medication Sig    brimonidine-timoloL (COMBIGAN) 0.2-0.5 % drop ophthalmic solution 1 Drop every twelve (12) hours.  latanoprost (XALATAN) 0.005 % ophthalmic solution INSTILL 1 DROP INTO BOTH EYES AT BEDTIME    ketorolac (ACULAR) 0.5 % ophthalmic solution INSTILL 1 DROP INTO BOTH EYES FOUR TIMES A DAY    ascorbic acid (VITAMIN C PO) Take 1 Tablet by mouth daily.  CALCIUM ACETATE PO Take 1 Tablet by mouth daily.  tadalafiL (Cialis) 5 mg tablet Take 1 Tab by mouth daily.  cetirizine (ZYRTEC) 10 mg tablet Take 10 mg by mouth daily as needed.  ibuprofen (MOTRIN IB) 200 mg tablet Take 400 mg by mouth as needed for Pain.  KRILL OIL PO Take 1 capsule by mouth daily.  GLUCOSAMINE/CHONDROITIN SULF A (GLUCOSAMINE-CHONDROITIN PO) Take 1,500 mg by mouth daily.  BENEFIBER, GUAR GUM, PO Take  by mouth. 1 tbsp daily    multivitamin (ONE A DAY) tablet Take 1 Tab by mouth daily.  Cholecalciferol, Vitamin D3, (VITAMIN D3) 1,000 unit cap Take 1 Tablet by mouth daily. No current facility-administered medications for this visit. REVIEW OF SYSTEM   Patient denies: Weight loss, Fever/Chills, HA, Visual changes, Fatigue, Chest pain, SOB, Abdominal pain, N/V/D/C, Blood in stool or urine, Edema. Pertinent positive as above in HPI.  All others were negative    PHYSICAL EXAM:   Visit Vitals  Pulse 60   Temp 96.9 °F (36.1 °C) (Skin)   Ht 5' 11\" (1.803 m)   Wt 179 lb 9.6 oz (81.5 kg)   SpO2 96%   BMI 25.05 kg/m²     The patient is a well-developed, well-nourished male   in no acute distress. The patient is alert and oriented times three. The patient is alert and oriented times three. Mood and affect are normal.  LYMPHATIC: lymph nodes are not enlarged and are within normal limits  SKIN: normal in color and non tender to palpation. There are no bruises or abrasions noted. NEUROLOGICAL: Motor sensory exam is within normal limits. Reflexes are equal bilaterally. There is normal sensation to pinprick and light touch  MUSCULOSKELETAL:  Examination Left knee Right knee   Skin Intact Intact   Range of motion 0-130 0-130   Effusion - -   Medial joint line tenderness - -   Lateral joint line tenderness - -   Tenderness Pes Bursa - -   Tenderness insertion MCL - -   Tenderness insertion LCL - -   Bks - -   Patella crepitus - +   Patella grind - +   Lachman - -   Pivot shift - -   Anterior drawer - -   Posterior drawer - -   Varus stress - -   Valgus stress - -   Neurovascular Intact Intact   Calf Swelling and Tenderness to Palpation - -   Rani's Test - -   Hamstring Cord Tightness - +        PROCEDURE:   Right knee Injection with Ultrasound Guidance    Indication:Right Knee pain/swelling    After sterile prep, 4 cc of Xylocaine and 1 cc of Kenalog were injected into the right Knee. Intra-articular Ultrasound images captured using [address] [place] [address] Loop Ultrasound machine using a frequency of 10 MHz with a linear transducer and scanned into patient's chart.            [facility] - [facility]  OFFICE PROCEDURE PROGRESS NOTE        Chart reviewed for the following:  Adithya Cowart M.D, have reviewed the History, Physical and updated the Allergic reactions for Ellen Bradshaw performed immediately prior to start of procedure:  Adithya Cowart M.D, have performed the following reviews on Fletcher Koyanagi prior to the start of the procedure:            * Patient was identified by name and date of birth   * Agreement on procedure being performed was verified  * Risks and Benefits explained to the patient  * Procedure site verified and marked as necessary  * Patient was positioned for comfort  * Needle placement confirmed by ultrasound  * Consent was signed and verified     Time: 9:31 AM     Date of procedure: 10/7/2021    Procedure performed by:  Rosaura Blakely M.D    Provider assisted by: (see medication administration)    How tolerated by patient: tolerated the procedure well with no complications    Comments: none      IMAGING: XR of the right knee with 2 views obtained in the office dated 10/7/2021 was reviewed and read by Dr. Leyla Acuña: Marked degenerative changes in the patellofemoral joint    IMPRESSION:      ICD-10-CM ICD-9-CM    1. Patellofemoral arthritis  M17.10 716.96 ARTHROCENTESIS ASPIR&/INJ MAJOR JT/BURSA W/US      triamcinolone acetonide (KENALOG-40) 40 mg/mL injection 40 mg      REFERRAL TO PHYSICAL THERAPY   2. Chronic pain of right knee  M25.561 719.46 AMB POC XRAY, KNEE; 1/2 VIEWS    G89.29 338.29    3. Chronic pain of left knee  M25.562 719.46     G89.29 338.29         PLAN:  1. Pt presents today with right knee pain due to patellofemoral arthritis and I am hopeful a right knee cortisone injection will provide relief. Start to PT to help with alleviating the stiffness. I discussed next steps including a MRI if the pain does not improve with the cortisone injection and PT. Return in 3 weeks if pain persists. Risk factors include: n/a  2. No ultrasound exam indicated today  3. Yes cortisone injection indicated today R KNEE US   4. Yes Physical/Occupational Therapy indicated today  5. No diagnostic test indicated today:   6. No durable medical equipment indicated today  7. No referral indicated today   8. No medications indicated today:   9.  No Narcotic indicated today     RTC 3 weeks       Scribed by Leodis Simmonds [facility]) as dictated by Rosaura Blakely MD    I,  Rosaura Blakely, confirm that all documentation is accurate.     Rosaura Blakely M.D.   [facility]

## 2021-10-13 ENCOUNTER — HOSPITAL ENCOUNTER (OUTPATIENT)
Dept: PHYSICAL THERAPY | Age: 71
Discharge: HOME | End: 2021-10-13
Payer: MEDICARE

## 2021-10-13 PROCEDURE — 97162 PT EVAL MOD COMPLEX 30 MIN: CPT

## 2021-10-13 PROCEDURE — 97112 NEUROMUSCULAR REEDUCATION: CPT

## 2021-10-13 PROCEDURE — 97110 THERAPEUTIC EXERCISES: CPT

## 2021-10-13 NOTE — PROGRESS NOTES
[facility]  [address]  [place], [address]  [phone] fax    Plan of Care/ Statement of Necessity for Physical Therapy Services    Patient name: Crystal Reilly Start of Care: 10/13/2021   Referral source: Mac Reid,* : 1950    Medical Diagnosis: Knee pain, right [M25.561]  Left knee pain [M25.562]  Payor: BLUE CROSS MEDICARE / Plan[place] / Product Type: Managed Care Medicare /  Onset Date:May 2020    Treatment Diagnosis: Right greater than Left knee pain   Prior Hospitalization: see medical history Provider#: [id number]   Medications: Verified on Patient summary List    Comorbidities: OA, Hearing, , Luekopenia   Prior Level of Function: Independent, Active     The Plan of Care and following information is based on the information from the initial evaluation. Assessment/ key information: Patient is a 71 yo male presenting with Right greater than Left knee pain. He reports long history of running until is 40's and currently has been active with swimming , biking, golfing and walking. Initial injury occurred in May of 2019 after a vacation of hiking when he pushed off from side of pool and felt pain in knee. This was followed by a reoccurrence in May of 2021 and knee has giving out several times since then. He reports that knee gives out when in a locked position causing pain and instability. Until recent cortisone injection on 10/7 his knee has had limited ROM and swelling. Today ROM is normal, however Right LE tests weaker than Left and patella postioning is lateral with positive crepitus. Patient aslo has fair posture with knee recurvatum as well as limited balance. Patient will benefit from PT to stabilize pelvis and knee and return to function.      Evaluation Complexity History MEDIUM  Complexity : 1-2 comorbidities / personal factors will impact the outcome/ POC ; Examination MEDIUM Complexity : 3 Standardized tests and measures addressing body structure, function, activity limitation and / or participation in recreation  ;Presentation MEDIUM Complexity : Evolving with changing characteristics  ; Clinical Decision Making MEDIUM Complexity : FOTO score of 26-74  Overall Complexity Rating: MEDIUM  Problem List: pain affecting function, decrease ROM, decrease strength, impaired gait/ balance, decrease ADL/ functional abilitiies, decrease activity tolerance and decrease flexibility/ joint mobility   Treatment Plan may include any combination of the following: Therapeutic exercise, Therapeutic activities, Neuromuscular re-education, Physical agent/modality, Gait/balance training, Manual therapy, Patient education, Functional mobility training and Stair training  Patient / Family readiness to learn indicated by: asking questions, trying to perform skills and interest  Persons(s) to be included in education: patient (P)  Barriers to Learning/Limitations: None  Patient Goal (s): Regain use of knee with exercise, get off floot, and use stairs normally without pain  Patient Self Reported Health Status: excellent  Rehabilitation Potential: good    Short Term Goals: To be accomplished in 2 weeks:   1. Patient to be issued and independent with HEP. 2.  Patient to deemonstrate ability to SLS EO for 20+ seconds with each leg. Long Term Goals: To be accomplished in 4 weeks:   1. Patient to ambulate 1 mile or greater community distances without knee giving out. 2.  Patient to have increased hip Abd/Ext strength to 5/5 to aid in stabilizing knee. 3.  Patient to demonstrate ability to eccentricly control descent on stairs. 4.  Patient to demonstrate ability to get off of floor using good body mechanics. 5..  Patient to report increase in FOTO to 75 or greater for increase QOL. Frequency / Duration: Patient to be seen 2  times per week for 4 weeks.     Patient/ Caregiver education and instruction: Diagnosis, prognosis, activity modification and exercises   [x]  Plan of care has been reviewed with SULEMAN Bell, PT 10/13/2021 7:42 PM    ________________________________________________________________________    I certify that the above Therapy Services are being furnished while the patient is under my care. I agree with the treatment plan and certify that this therapy is necessary.     [de-identified] Signature:____________Date:_________TIME:________     Jaye Monday,*  ** Signature, Date and Time must be completed for valid certification **    Please sign and return to [facility] [address] [place] [address] & [facility]  [address] [place] [address] [address]  [phone] fax

## 2021-10-13 NOTE — PROGRESS NOTES
PT DAILY TREATMENT NOTE     Patient Name: Claudia Jones  Date:10/13/2021  : 1950  [x]  Patient  Verified  Payor: BLUE CROSS MEDICARE / Plan: [place] / Product Type: Managed Care Medicare /    In time:215  Out time:300  Total Treatment Time (min): 45  Visit #: 1 of 8     Medicare/BCBS Only   Total Timed Codes (min):  25 1:1 Treatment Time:  45       Treatment Area: Knee pain, right [M25.561]  Left knee pain [M25.562]    SUBJECTIVE  Pain Level (0-10 scale): Any medication changes, allergies to medications, adverse drug reactions, diagnosis change, or new procedure performed?: [x] No    [] Yes (see summary sheet for update)  Subjective functional status/changes:   [] No changes reported  \" Pain occurs and gives out when knee is fully extended. \"    OBJECTIVE    20 min [x]Eval                  []Re-Eval       15 Min Therapeutic Exercise:  [x] See flow sheet : issued HEP   Rationale: increase ROM and increase strength to improve the patients ability to perform ADL's with ease. 10 min Neuromuscular Re-education:  [x]  See flow sheet :   Rationale: increase strength, improve balance and increase proprioception  to improve the patients ability to stabilize pelvis, improve gait and transfers. With   [x] TE   [] TA   [x] neuro   [] other: Patient Education: [x] Review HEP    [] Progressed/Changed HEP based on:   [] positioning   [] body mechanics   [] transfers   [] heat/ice application    [] other:      Other Objective/Functional Measures: See EVAL     Pain Level (0-10 scale) post treatment: 1/10    ASSESSMENT/Changes in Function: Patient is a 69 yo male presenting with Right greater than Left knee pain. He reports long history of running until is 40's and currently has been active with swimming , biking, golfing and walking. Initial injury occurred in May of 2019 after a vacation of hiking when he pushed off from side of pool and felt pain in knee.  This was followed by a reoccurrence in May of 2021 and knee has giving out several times since then. He reports that knee gives out when in a locked position causing pain and instability. Until recent cortisone injection on 10/7 his knee has had limited ROM and swelling. Today ROM is normal, however Right LE tests weaker than Left and patella postioning is lateral with positive crepitus. Patient aslo has fair posture with knee recurvatum as well as limited balance. Patient will benefit from PT to stabilize pelvis and knee and return to function. Patient will continue to benefit from skilled PT services to modify and progress therapeutic interventions, address functional mobility deficits, address ROM deficits, address strength deficits, analyze and address soft tissue restrictions, analyze and cue movement patterns, analyze and modify body mechanics/ergonomics and assess and modify postural abnormalities to attain remaining goals. [x]  See Plan of Care  []  See progress note/recertification  []  See Discharge Summary         Progress towards goals / Updated goals:  Short Term Goals: To be accomplished in 2 weeks:   1. Patient to be issued and independent with HEP. EVAL: Issued HEP   2. Patient to demonstrate ability to SLS EO for 20+ seconds with each leg. EVAL:  SLS Right 5 sec, Left 10 sec EO  Long Term Goals: To be accomplished in 4 weeks:   1. Patient to ambulate 1 mile or greater community distances without knee giving out. EVAL:  Fear of knee giving out with full extension. 2.  Patient to have increased hip Abd/Ext strength to 5/5 to aid in stabilizing knee. EVAL:  Right Hip Abd 4-/5, Hip Ext 3+/5   3. Patient to demonstrate ability to eccentricly control descent on stairs. EVAL:  Lacks eccentric control side steps   4. Patient to demonstrate ability to get off of floor using good body mechanics.     EVAL:  NA Patient reported goal to learn   5..  Patient to report increase in FOTO to 75 or greater for increase QOL.     EVAL: FOTO 64    PLAN  []  Upgrade activities as tolerated     [x]  Continue plan of care  []  Update interventions per flow sheet       []  Discharge due to:_  []  Other:_      Kavitha Benson, PT 10/13/2021  2:24 PM    Future Appointments   Date Time Provider Ligia Myrna   10/28/2021  8:10 AM Madyson Packer MD [facility]   6/27/2022  8:00 AM Freedom Enriquez MD [facility] BS [facility]

## 2021-10-14 ENCOUNTER — HOSPITAL ENCOUNTER (OUTPATIENT)
Dept: PHYSICAL THERAPY | Age: 71
Discharge: HOME | End: 2021-10-14
Payer: MEDICARE

## 2021-10-14 PROCEDURE — 97110 THERAPEUTIC EXERCISES: CPT

## 2021-10-14 PROCEDURE — 97112 NEUROMUSCULAR REEDUCATION: CPT

## 2021-10-14 NOTE — PROGRESS NOTES
PT DAILY TREATMENT NOTE     Patient Name: Db Ramos  Date:10/14/2021  : 1950  [x]  Patient  Verified  Payor: BLUE CROSS MEDICARE / Plan: [place] P[place] / Product Type: Managed Care Medicare /    In time:2:58  Out time:3:43  Total Treatment Time (min): 45  Visit #: 2 of 8    Medicare/BCBS Only   Total Timed Codes (min):  45 1:1 Treatment Time:  45        Treatment Area: Knee pain, right [M25.561]  Left knee pain [M25.562]    SUBJECTIVE  Pain Level (0-10 scale): 0/10  Any medication changes, allergies to medications, adverse drug reactions, diagnosis change, or new procedure performed?: [x] No    [] Yes (see summary sheet for update)  Subjective functional status/changes:   [] No changes reported  The patient reports he has had a quite busy day including playing 18 holes of golf and reports no real pain to speak of concerning his knees. OBJECTIVE  30 min Therapeutic Exercise:  [] See flow sheet :   Rationale: increase ROM and increase strength to improve the patients ability to improve ADl ease. 15 min Neuromuscular Re-education:  []  See flow sheet :   Rationale: increase ROM, increase strength and improve coordination  to improve the patients ability to improve ADL ease. With   [] TE   [] TA   [] neuro   [] other: Patient Education: [x] Review HEP    [] Progressed/Changed HEP based on:   [] positioning   [] body mechanics   [] transfers   [] heat/ice application    [] other:      Other Objective/Functional Measures:    Initaited exercises per flow sheet. Fatigued through glute med quickly. Pain Level (0-10 scale) post treatment: 0/10    ASSESSMENT/Changes in Function: The patient required cues in order to perform exercises correctly. He did not have an exacerbation of pain throughout session. Emphasis of ABD/hip ER strengthening/stabilization in order to mitigate valgus and maximize stability at patellofemoral joint in standing.      Patient will continue to benefit from skilled PT services to modify and progress therapeutic interventions, address functional mobility deficits, address ROM deficits, address strength deficits, analyze and address soft tissue restrictions, analyze and cue movement patterns, analyze and modify body mechanics/ergonomics, assess and modify postural abnormalities and instruct in home and community integration to attain remaining goals. []  See Plan of Care  []  See progress note/recertification  []  See Discharge Summary         Progress towards goals / Updated goals:  Short Term Goals: To be accomplished in 2 weeks:              1.  Patient to be issued and independent with HEP. EVAL: Issued HEP   Current: Met reports compliance 10/14/2021              2.  Patient to demonstrate ability to SLS EO for 20+ seconds with each leg. EVAL:  SLS Right 5 sec, Left 10 sec EO  Long Term Goals: To be accomplished in 4 weeks:              1.  Patient to ambulate 1 mile or greater community distances without knee giving out. EVAL:  Fear of knee giving out with full extension. 2.  Patient to have increased hip Abd/Ext strength to 5/5 to aid in stabilizing knee. EVAL:  Right Hip Abd 4-/5, Hip Ext 3+/5              3.  Patient to demonstrate ability to eccentricly control descent on stairs. EVAL:  Lacks eccentric control side steps              4.  Patient to demonstrate ability to get off of floor using good body mechanics. EVAL:  NA Patient reported goal to learn              5..  Patient to report increase in FOTO to 75 or greater for increase QOL.                EVAL: FOTO 64    PLAN  []  Upgrade activities as tolerated     [x]  Continue plan of care  []  Update interventions per flow sheet       []  Discharge due to:_  []  Other:_      Porfirio Lim, PT 10/14/2021  3:03 PM    Future Appointments   Date Time Provider Ligia Norris   10/19/2021  1:30 PM Deloris Lilly Cali Kimball, PT [facility]   10/26/2021  1:45 PM Tierra Colón, PTA [facility]   10/28/2021  8:10 AM Jeannie Santos MD [facility] AM[facility]   10/29/2021 11:30 AM João More, PT [facility]   11/1/2021  3:00 PM João More, PT [facility]   11/5/2021 10:00 AM João More, PT [facility]   11/8/2021  3:00 PM João More, PT [facility]   6/27/2022  8:00 AM Que Vasquez MD [facility]

## 2021-10-16 ENCOUNTER — DOCUMENTATION ONLY (OUTPATIENT)
Dept: FAMILY MEDICINE CLINIC | Age: 71
End: 2021-10-16

## 2021-10-19 ENCOUNTER — HOSPITAL ENCOUNTER (OUTPATIENT)
Dept: PHYSICAL THERAPY | Age: 71
Discharge: HOME | End: 2021-10-19
Payer: MEDICARE

## 2021-10-19 PROCEDURE — 97112 NEUROMUSCULAR REEDUCATION: CPT

## 2021-10-19 PROCEDURE — 97110 THERAPEUTIC EXERCISES: CPT

## 2021-10-19 NOTE — PROGRESS NOTES
PT DAILY TREATMENT NOTE     Patient Name: John Alvarez  Date:10/19/2021  : 1950  [x]  Patient  Verified  Payor: BLUE CROSS MEDICARE / [place]: [place]PO / Product Type: Managed Care Medicare /    In time:131PM  Out time:215pm  Total Treatment Time (min): 44  Visit #: 3 of 8    Medicare/BCBS Only   Total Timed Codes (min):  44 1:1 Treatment Time:  39       Treatment Area: Knee pain, right [M25.561]  Left knee pain [M25.562]    SUBJECTIVE  Pain Level (0-10 scale): 0  Any medication changes, allergies to medications, adverse drug reactions, diagnosis change, or new procedure performed?: [x] No    [] Yes (see summary sheet for update)  Subjective functional status/changes:   [] No changes reported  Pt reports doing pilates with Veatrice Ramiro on Monday mornings. Felt okay after his first sessions but the knees did feel tired. OBJECTIVE    24 min Therapeutic Exercise:  [x] See flow sheet :   Rationale: increase ROM and increase strength to improve the patients ability to manage ADLs with improved ease and reduced pain. 15 min Neuromuscular Re-education:  [x]  See flow sheet :   Rationale: increase strength, improve coordination, improve balance and increase proprioception  to improve the patients ability to manage functional activities with improved knee stability. With   [] TE   [] TA   [] neuro   [] other: Patient Education: [x] Review HEP    [] Progressed/Changed HEP based on:   [] positioning   [] body mechanics   [] transfers   [] heat/ice application    [] other:      Other Objective/Functional Measures: exercises progressed per flowsheet   Pain Level (0-10 scale) post treatment: 0, sore knees and hip abductors    ASSESSMENT/Changes in Function: Pt requires tactile and visual cues to maintain and assume neutral knee posture and avoid valgus collapse.  Hip abductors and quads fatigue quickly, with pt's right knee experiencing increased crepitus and pain with controlled step downs. B knees have difficulty with double leg RDL, initially flexing smoothly but then hinging suddenly, similar to a buckle due to patellar discomfort. Mat designated exercises on the flowsheet were performed on the plinth, as pt has discomfort getting onto and off of the floor. Patient will continue to benefit from skilled PT services to modify and progress therapeutic interventions, address functional mobility deficits, address ROM deficits, address strength deficits, analyze and address soft tissue restrictions, analyze and cue movement patterns, analyze and modify body mechanics/ergonomics, assess and modify postural abnormalities, address imbalance/dizziness and instruct in home and community integration to attain remaining goals. []  See Plan of Care  []  See progress note/recertification  []  See Discharge Summary         Progress towards goals / Updated goals:  Short Term Goals: To be accomplished in 2 weeks:              0.  Patient to be issued and independent with HEP.             EVAL: Issued HEP              Current: Met reports compliance 10/14/2021              2.  Patient to demonstrate ability to SLS EO for 20+ seconds with each leg.              EVAL:  SLS Right 5 sec, Left 10 sec EO   Current: met, SLS right 30 sec, left 20 sec EO firm ground (10/19/2021)  Long Term Goals: To be accomplished in 4 weeks:              1.  Patient to ambulate 1 mile or greater community distances without knee giving out.               EVAL:  Fear of knee giving out with full extension.              2.  Patient to have increased hip Abd/Ext strength to 5/5 to aid in stabilizing knee.              EVAL:  Right Hip Abd 4-/5, Hip Ext 3+/5              3.  Patient to demonstrate ability to eccentricly control descent on stairs.             EVAL:  Lacks eccentric control side steps              4.  Patient to demonstrate ability to get off of floor using good body mechanics.                Albert Harding Patient reported goal to learn              5. .  Patient to report increase in FOTO to 75 or greater for increase QOL.                EVAL: FOTO 59       PLAN  []  Upgrade activities as tolerated     [x]  Continue plan of care  []  Update interventions per flow sheet       []  Discharge due to:_  []  Other:_      Karina Smith, PT 10/19/2021  1:38 PM    Future Appointments   Date Time Provider Ligia Myrna   10/26/2021  1:45 PM Kendall Burdick, PTA [facility]   10/28/2021  8:10 AM Diana Spencer MD [facility]   10/29/2021 11:30 AM Marylouise Pattee, PT [facility]   11/1/2021  3:00 PM Marylouise Pattee, PT [facility]   11/5/2021 10:00 AM Marylouise Pattee, PT [facility]   11/8/2021  3:00 PM Marylouise Pattee, PT [facility]   6/27/2022  8:00 AM Jeevan Cabrera MD [facility] BS AMB

## 2021-10-26 ENCOUNTER — HOSPITAL ENCOUNTER (OUTPATIENT)
Dept: PHYSICAL THERAPY | Age: 71
Discharge: HOME | End: 2021-10-26
Payer: MEDICARE

## 2021-10-26 PROCEDURE — 97112 NEUROMUSCULAR REEDUCATION: CPT

## 2021-10-26 PROCEDURE — 97110 THERAPEUTIC EXERCISES: CPT

## 2021-10-26 NOTE — PROGRESS NOTES
PT DAILY TREATMENT NOTE     Patient Name: Madeline Cantu  Date:10/26/2021  : 1950  [x]  Patient  Verified  Payor: BLUE CROSS MEDICARE / [place]: [place] PPO / Product Type: Managed Care Medicare /    In time:1:45  Out time:2:23  Total Treatment Time (min): 38  Visit #: 4 of 8    Medicare/BCBS Only   Total Timed Codes (min):  38 1:1 Treatment Time:  38       Treatment Area: Knee pain, right [M25.561]  Left knee pain [M25.562]    SUBJECTIVE  Pain Level (0-10 scale): 0/10  Any medication changes, allergies to medications, adverse drug reactions, diagnosis change, or new procedure performed?: [x] No    [] Yes (see summary sheet for update)  Subjective functional status/changes:   [] No changes reported  Pt reports he is doing about 30 minutes on the elliptical and occasionally has pain. OBJECTIVE    28 min Therapeutic Exercise:  [x] See flow sheet :   Rationale: increase ROM and increase strength to improve the patients ability to perform ADLs with increased ease. 10 min Neuromuscular Re-education:  [x]  See flow sheet :   Rationale: increase strength, improve coordination and increase proprioception  to improve the patients ability to perform ADLs with increased ease. With   [] TE   [] TA   [] neuro   [] other: Patient Education: [x] Review HEP    [] Progressed/Changed HEP based on:   [] positioning   [] body mechanics   [] transfers   [] heat/ice application    [] other:      Other Objective/Functional Measures: Pain with step downs. Pain Level (0-10 scale) post treatment: 0/10    ASSESSMENT/Changes in Function: Pt demonstrates poor eccentric quad control with step downs with increased pain. Pt has no adverse reaction to treatment.      Patient will continue to benefit from skilled PT services to modify and progress therapeutic interventions, address functional mobility deficits, address ROM deficits, address strength deficits, analyze and address soft tissue restrictions, analyze and cue movement patterns, analyze and modify body mechanics/ergonomics and assess and modify postural abnormalities to attain remaining goals. []  See Plan of Care  []  See progress note/recertification  []  See Discharge Summary         Progress towards goals / Updated goals:  Short Term Goals: To be accomplished in 2 weeks:              3.  Patient to be issued and independent with HEP.             EVAL: Issued HEP              CYHEZXX: Met reports compliance 10/14/2021              2.  Patient to demonstrate ability to SLS EO for 20+ seconds with each leg.              EVAL:  SLS Right 5 sec, Left 10 sec EO              Current: met, SLS right 30 sec, left 20 sec EO firm ground (10/19/2021)  Long Term Goals: To be accomplished in 4 weeks:              1.  Patient to ambulate 1 mile or greater community distances without knee giving out.               EVAL:  Fear of knee giving out with full extension.              2.  Patient to have increased hip Abd/Ext strength to 5/5 to aid in stabilizing knee.              EVAL:  Right Hip Abd 4-/5, Hip Ext 3+/5              3.  Patient to demonstrate ability to eccentricly control descent on stairs.             EVAL:  Lacks eccentric control side steps   Current: Pt unable to perform step downs on 4 inch step with control on right LE. 10/26/2021              4.  Patient to demonstrate ability to get off of floor using good body mechanics.             EVAL:  NA Patient reported goal to learn  [id number]. .  Patient to report increase in FOTO to 75 or greater for increase QOL.                EVAL: FOTO 58    PLAN  []  Upgrade activities as tolerated     [x]  Continue plan of care  []  Update interventions per flow sheet       []  Discharge due to:_  []  Other:_      Adelso Rodarte, SULEMAN 10/26/2021  1:51 PM    Future Appointments   Date Time Provider Ligia Norris   10/28/2021  8:10 AM Emma Constantino MD [facility]   10/29/2021 11:30 AM Jose Singh, PT [facility]   11/1/2021  3:00 PM Jose Singh, PT [facility]   11/5/2021 10:00 AM Jose Singh, PT [facility]   11/8/2021  3:00 PM Jose Singh, PT [facility]   6/27/2022  8:00 AM Estrella Crisostomo MD [facility] AM[facility]

## 2021-10-28 ENCOUNTER — OFFICE VISIT (OUTPATIENT)
Dept: ORTHOPEDIC SURGERY | Age: 71
End: 2021-10-28
Payer: MEDICARE

## 2021-10-28 VITALS
OXYGEN SATURATION: 98 % | BODY MASS INDEX: 25.34 KG/M2 | WEIGHT: 181 LBS | TEMPERATURE: 97.1 F | HEART RATE: 52 BPM | HEIGHT: 71 IN

## 2021-10-28 DIAGNOSIS — M17.10 PATELLOFEMORAL ARTHRITIS: Primary | ICD-10-CM

## 2021-10-28 PROCEDURE — G8427 DOCREV CUR MEDS BY ELIG CLIN: HCPCS | Performed by: ORTHOPAEDIC SURGERY

## 2021-10-28 PROCEDURE — 1101F PT FALLS ASSESS-DOCD LE1/YR: CPT | Performed by: ORTHOPAEDIC SURGERY

## 2021-10-28 PROCEDURE — 3017F COLORECTAL CA SCREEN DOC REV: CPT | Performed by: ORTHOPAEDIC SURGERY

## 2021-10-28 PROCEDURE — G8432 DEP SCR NOT DOC, RNG: HCPCS | Performed by: ORTHOPAEDIC SURGERY

## 2021-10-28 PROCEDURE — G8536 NO DOC ELDER MAL SCRN: HCPCS | Performed by: ORTHOPAEDIC SURGERY

## 2021-10-28 PROCEDURE — 99212 OFFICE O/P EST SF 10 MIN: CPT | Performed by: ORTHOPAEDIC SURGERY

## 2021-10-28 PROCEDURE — G8419 CALC BMI OUT NRM PARAM NOF/U: HCPCS | Performed by: ORTHOPAEDIC SURGERY

## 2021-10-28 NOTE — PROGRESS NOTES
Carly Ugarte  1950   Chief Complaint   Patient presents with    Knee Pain     right knee        HISTORY OF PRESENT ILLNESS  Carly Ugarte is a 70 y.o. male who presents today for evaluation of b/l knee. R>L He rates his pain 5/10 today. At last OV on 10/7/2021, patient had a right knee cortisone injection which provided relief. Pt reports he is back to his normal routine and has improved since last OV. Still notes pain with walking down stairs and standing from low positions. Initial pain began in June 2021. Pt does swim often as a form of exercise. Patient denies any fever, chills, chest pain, shortness of breath or calf pain. The remainder of the review of systems is negative. There are no new illness or injuries to report since last seen in the office. There are no changes to medications, allergies, family or social history. PHYSICAL EXAM:   Visit Vitals  Pulse (!) 52   Temp 97.1 °F (36.2 °C) (Temporal)   Ht 5' 11\" (1.803 m)   Wt 181 lb (82.1 kg)   SpO2 98%   BMI 25.24 kg/m²     The patient is a well-developed, well-nourished male   in no acute distress. The patient is alert and oriented times three. The patient is alert and oriented times three. Mood and affect are normal.  LYMPHATIC: lymph nodes are not enlarged and are within normal limits  SKIN: normal in color and non tender to palpation. There are no bruises or abrasions noted. NEUROLOGICAL: Motor sensory exam is within normal limits. Reflexes are equal bilaterally.  There is normal sensation to pinprick and light touch  MUSCULOSKELETAL:  Examination Left knee Right knee   Skin Intact Intact   Range of motion 0-130 0-130   Effusion - -   Medial joint line tenderness - -   Lateral joint line tenderness - -   Tenderness Pes Bursa - -   Tenderness insertion MCL - -   Tenderness insertion LCL - -   Bks - -   Patella crepitus - +   Patella grind - +   Lachman - -   Pivot shift - -   Anterior drawer - -   Posterior drawer - -   Varus stress - -   Valgus stress - -   Neurovascular Intact Intact   Calf Swelling and Tenderness to Palpation - -   Rani's Test - -   Hamstring Cord Tightness - +       IMAGING: XR of the right knee with 2 views obtained in the office dated 10/7/2021 was reviewed and read by Dr. Emory Burkett: Marked degenerative changes in the patellofemoral joint    IMPRESSION:      ICD-10-CM ICD-9-CM    1. Patellofemoral arthritis  M17.10 716.96         PLAN:  1. Pt presents today with right knee pain due to patellofemoral arthritis. He has improved since last OV with PT and the cortisone injections. I recommended him to continue to hamstring stretching and emphasized using pain as a guide. Avoid lunges and squats. Return as needed. Risk factors include: n/a  2. No ultrasound exam indicated today  3. No cortisone injection indicated today  4. No Physical/Occupational Therapy indicated today  5. No diagnostic test indicated today:   6. No durable medical equipment indicated today  7. No referral indicated today   8. No medications indicated today:   9. No Narcotic indicated today     RTC prn      Scribed by Kelsey Harrison [id number]) as dictated by Carlos Rossi MD    I, Dr. Carlos Rossi, confirm that all documentation is accurate.     Carlos Rossi M.D.   [facility]

## 2021-10-29 ENCOUNTER — HOSPITAL ENCOUNTER (OUTPATIENT)
Dept: PHYSICAL THERAPY | Age: 71
Discharge: HOME | End: 2021-10-29
Payer: MEDICARE

## 2021-10-29 PROCEDURE — 97112 NEUROMUSCULAR REEDUCATION: CPT

## 2021-10-29 PROCEDURE — 97110 THERAPEUTIC EXERCISES: CPT

## 2021-10-29 NOTE — PROGRESS NOTES
PT DAILY TREATMENT NOTE     Patient Name: Fletcher Koyanagi  Date:10/29/2021  : 1950  [x]  Patient  Verified  Payor: BLUE CROSS MEDICARE / [place]: [place] / Product Type: Managed Care Medicare /    In time:11:30  Out time:12:09  Total Treatment Time (min): 39  Visit #: 5 of 8    Medicare/BCBS Only   Total Timed Codes (min):  39 1:1 Treatment Time:  39       Treatment Area: Knee pain, right [M25.561]  Left knee pain [M25.562]    SUBJECTIVE  Pain Level (0-10 scale): 0/10  Any medication changes, allergies to medications, adverse drug reactions, diagnosis change, or new procedure performed?: [x] No    [] Yes (see summary sheet for update)  Subjective functional status/changes:   [] No changes reported  The patient states that his left hip is a little sore, but feels that it is coming from golf. He states his knees are painful when getting up from sitting or coming down stairs occasionally. OBJECTIVE  24 min Therapeutic Exercise:  [x] See flow sheet :   Rationale: increase ROM and increase strength to improve the patients ability to improve ADL ease. 15 min Neuromuscular Re-education:  [x]  See flow sheet :   Rationale: increase ROM and increase strength  to improve the patients ability to improve ADL ease. With   [] TE   [] TA   [] neuro   [] other: Patient Education: [x] Review HEP    [] Progressed/Changed HEP based on:   [] positioning   [] body mechanics   [] transfers   [] heat/ice application    [] other:      Other Objective/Functional Measures:     Pain Level (0-10 scale) post treatment: 0/10    ASSESSMENT/Changes in Function: The patient demonstrated improved single leg stability on airex, as well as demonstrated improving control during squats on core align. He did not have an increase in pain throughout session, and indicated he felt a improved ease of step downs as well. He left in no pain with questions answered.      Patient will continue to benefit from skilled PT services to modify and progress therapeutic interventions, address functional mobility deficits, address ROM deficits, address strength deficits, analyze and address soft tissue restrictions, analyze and cue movement patterns, analyze and modify body mechanics/ergonomics, assess and modify postural abnormalities and instruct in home and community integration to attain remaining goals. []  See Plan of Care  []  See progress note/recertification  []  See Discharge Summary         Progress towards goals / Updated goals:  Short Term Goals: To be accomplished in 2 weeks:              9.  Patient to be issued and independent with HEP.             EVAL: Issued HEP              GYFZEBX: Met reports compliance 10/14/2021              2.  Patient to demonstrate ability to SLS EO for 20+ seconds with each leg.              EVAL:  SLS Right 5 sec, Left 10 sec EO              Current: met, SLS right 30 sec, left 20 sec EO firm ground (10/19/2021)  Long Term Goals: To be accomplished in 4 weeks:              1.  Patient to ambulate 1 mile or greater community distances without knee giving out.               EVAL:  Fear of knee giving out with full extension.              2.  Patient to have increased hip Abd/Ext strength to 5/5 to aid in stabilizing knee.              EVAL:  Right Hip Abd 4-/5, Hip Ext 3+/5              3.  Patient to demonstrate ability to eccentricly control descent on stairs.             EVAL:  Lacks eccentric control side steps              Current: Pt unable to perform step downs on 4 inch step with control on right LE. 10/26/2021              4.  Patient to demonstrate ability to get off of floor using good body mechanics.             EVAL:  NA Patient reported goal to learn   97 [id number]. .  Patient to report increase in FOTO to 75 or greater for increase QOL.                EVAL: FOTO 58    PLAN  []  Upgrade activities as tolerated     [x]  Continue plan of care  []  Update interventions per flow sheet       []  Discharge due to:_  []  Other:_      Ton Lopez, PT 10/29/2021  12:05 PM    Future Appointments   Date Time Provider Ligia Myrna   11/1/2021  3:00 PM Sanjana Bourgeois, PT [facility]   11/5/2021 10:00 AM Sanjana Bourgeois, PT [facility]   11/8/2021  3:00 PM Sanjana Bourgeois, PT [facility]   6/27/2022  8:00 AM Juan F Wu MD [facility]

## 2021-11-01 ENCOUNTER — HOSPITAL ENCOUNTER (OUTPATIENT)
Dept: PHYSICAL THERAPY | Age: 71
Discharge: HOME | End: 2021-11-01
Payer: MEDICARE

## 2021-11-01 LAB — PSA SERPL-MCNC: 0.5 NG/ML (ref 0–4)

## 2021-11-01 PROCEDURE — 97112 NEUROMUSCULAR REEDUCATION: CPT

## 2021-11-01 PROCEDURE — 97110 THERAPEUTIC EXERCISES: CPT

## 2021-11-01 NOTE — PROGRESS NOTES
PT DAILY TREATMENT NOTE     Patient Name: Mariah Cortez  Date:2021  : 1950  [x]  Patient  Verified  Payor: BLUE CROSS MEDICARE / [place]: [place] / Product Type: Managed Care Medicare /    In time:3:00  Out time:3:43  Total Treatment Time (min): 43  Visit #: 6 of 8    Medicare/BCBS Only   Total Timed Codes (min):  43 1:1 Treatment Time:  43       Treatment Area: Knee pain, right [M25.561]  Left knee pain [M25.562]    SUBJECTIVE  Pain Level (0-10 scale): 1/10  Any medication changes, allergies to medications, adverse drug reactions, diagnosis change, or new procedure performed?: [x] No    [] Yes (see summary sheet for update)  Subjective functional status/changes:   [] No changes reported  The patient reports that he is doing well upon arrival, slight pain upon arrival, but had a good Pilates workout this morning. OBJECTIVE  31 min Therapeutic Exercise:  [x] See flow sheet :   Rationale: increase ROM and increase strength to improve the patients ability to improve ADL ease. 12 min Neuromuscular Re-education:  [x]  See flow sheet :   Rationale: improve coordination, improve balance and increase proprioception  to improve the patients ability to improve ADL ease. With   [] TE   [] TA   [] neuro   [] other: Patient Education: [x] Review HEP    [] Progressed/Changed HEP based on:   [] positioning   [] body mechanics   [] transfers   [] heat/ice application    [] other:      Other Objective/Functional Measures:   Single leg stance > 25\" each  Ambulating 1 mile: No fear of giving out. Progressing with 4\" step downs following form correction cues, though does continue to compensate somewhat    Hip ABD strength: 4+/5 MMT B    Pain Level (0-10 scale) post treatment: 0/10    ASSESSMENT/Changes in Function: The patient has progressed with both single leg stance and community ambulation efficiency. He will continue to benefit from further PT in order to progress strengthening. Encouraged the patient to perform sidestepping as well as bridging with TB around knees as a part of his HEP. He verbalizes understanding and left with no pain. Patient will continue to benefit from skilled PT services to modify and progress therapeutic interventions, address functional mobility deficits, address ROM deficits, address strength deficits, analyze and address soft tissue restrictions, analyze and cue movement patterns, analyze and modify body mechanics/ergonomics, assess and modify postural abnormalities and instruct in home and community integration to attain remaining goals. []  See Plan of Care  []  See progress note/recertification  []  See Discharge Summary         Progress towards goals / Updated goals:  Short Term Goals: To be accomplished in 2 weeks:              6.  Patient to be issued and independent with HEP.             EVAL: Issued HEP              YESSENIA: Met reports compliance 10/14/2021              2.  Patient to demonstrate ability to SLS EO for 20+ seconds with each leg.              EVAL:  SLS Right 5 sec, Left 10 sec EO              Current: met, SLS right 30 sec, left 20 sec EO firm ground (10/19/2021)  Long Term Goals: To be accomplished in 4 weeks:              1.  Patient to ambulate 1 mile or greater community distances without knee giving out.               EVAL:  Fear of knee giving out with full extension. Current: Met - the patient feels he will be able to               2.  Patient to have increased hip Abd/Ext strength to 5/5 to aid in stabilizing knee.              EVAL:  Right Hip Abd 4-/5, Hip Ext 3+/5   Current: Progressed to 4+/5 MMT hip ABD 11/01/2021              3.  Patient to demonstrate ability to eccentricly control descent on stairs.                EVAL:  Lacks eccentric control side steps              IJHLRGW: Pt unable to perform step downs on 4 inch step with control on right LE. 10/26/2021              4.  Patient to demonstrate ability to get off of floor using good body mechanics.             EVAL:  NA Patient reported goal to learn   97 [id number]. .  Patient to report increase in FOTO to 75 or greater for increase QOL.                EVAL: FOTO 58    PLAN  []  Upgrade activities as tolerated     [x]  Continue plan of care  []  Update interventions per flow sheet       []  Discharge due to:_  []  Other:_      Monica Baca, PT 11/1/2021  3:07 PM    Future Appointments   Date Time Provider Ligia Norris   11/5/2021 10:00 AM Susan Dempsey, PT [facility]   11/8/2021  3:00 PM Susan Dempsey PT [facility]   6/27/2022  8:00 AM Rachel Joseph MD [facility]

## 2021-11-03 DIAGNOSIS — Z12.5 PROSTATE CANCER SCREENING: ICD-10-CM

## 2021-11-03 NOTE — TELEPHONE ENCOUNTER
This patient contacted office for the following prescriptions to be filled:    Medication requested :   Requested Prescriptions     Pending Prescriptions Disp Refills    tadalafiL (Cialis) 5 mg tablet 180 Tablet 1     Sig: Take 1 Tablet by mouth daily.      PCP: Kevin Galdamez 39. or Print: Print   Mail order or Local pharmacy [facility]     Scheduled appointment if not seen by current providers in office:  LOV 6/24/2021 f/u 6/27/2022  Please advise when ready for

## 2021-11-04 LAB
BACTERIA UR CULT: ABNORMAL
BACTERIA UR CULT: ABNORMAL

## 2021-11-04 RX ORDER — TADALAFIL 5 MG/1
5 TABLET ORAL DAILY
Qty: 180 TABLET | Refills: 1 | Status: SHIPPED | OUTPATIENT
Start: 2021-11-04 | End: 2022-06-27

## 2021-11-05 ENCOUNTER — HOSPITAL ENCOUNTER (OUTPATIENT)
Dept: PHYSICAL THERAPY | Age: 71
Discharge: HOME | End: 2021-11-05
Payer: MEDICARE

## 2021-11-05 PROCEDURE — 97110 THERAPEUTIC EXERCISES: CPT

## 2021-11-05 PROCEDURE — 97112 NEUROMUSCULAR REEDUCATION: CPT

## 2021-11-05 NOTE — PROGRESS NOTES
PT DAILY TREATMENT NOTE     Patient Name: Debbie Maria  Date:2021  : 1950  [x]  Patient  Verified  Payor: BLUE CROSS MEDICARE / [place]: [place]IC[place] PPO / Product Type: Managed Care Medicare /    In time:9:59  Out time:10:40  Total Treatment Time (min): 41  Visit #: 7 of 8    Medicare/BCBS Only   Total Timed Codes (min):  41 1:1 Treatment Time:  41       Treatment Area: Knee pain, right [M25.561]  Left knee pain [M25.562]    SUBJECTIVE  Pain Level (0-10 scale): 0/10  Any medication changes, allergies to medications, adverse drug reactions, diagnosis change, or new procedure performed?: [x] No    [] Yes (see summary sheet for update)  Subjective functional status/changes:   [] No changes reported  The patient states that his knees are a little sore today. He was able to participate in a cycling class with sprints, and increasing/decreasing resistances. OBJECTIVE  26 min Therapeutic Exercise:  [x] See flow sheet :   Rationale: increase ROM and increase strength to improve the patients ability to improve ADL ease. 15 min Neuromuscular Re-education:  [x]  See flow sheet :   Rationale: increase ROM, increase strength and improve coordination  to improve the patients ability to improve ADL ease. With   [] TE   [] TA   [] neuro   [] other: Patient Education: [x] Review HEP    [] Progressed/Changed HEP based on:   [] positioning   [] body mechanics   [] transfers   [] heat/ice application    [] other:      Other Objective/Functional Measures: The patient demonstrates excellent technique with floor to standing technique void of cue requirements. Pain Level (0-10 scale) post treatment: 0/10    ASSESSMENT/Changes in Function: The patient is progressing quite well regarding his strength. He met LTG concerning floor to stand transfers independently. Anticipated D/C at next visit due to good progress towards goals.      Patient will continue to benefit from skilled PT services to modify and progress therapeutic interventions, address functional mobility deficits, address ROM deficits, address strength deficits, analyze and address soft tissue restrictions, analyze and cue movement patterns, analyze and modify body mechanics/ergonomics, assess and modify postural abnormalities and instruct in home and community integration to attain remaining goals. []  See Plan of Care  []  See progress note/recertification  []  See Discharge Summary         Progress towards goals / Updated goals:  Short Term Goals: To be accomplished in 2 weeks:              4.  Patient to be issued and independent with HEP.             EVAL: Issued HEP              ZEXNOPG: Met reports compliance 10/14/2021              2.  Patient to demonstrate ability to SLS EO for 20+ seconds with each leg.              EVAL:  SLS Right 5 sec, Left 10 sec EO              Current: met, SLS right 30 sec, left 20 sec EO firm ground (10/19/2021)  Long Term Goals: To be accomplished in 4 weeks:              1.  Patient to ambulate 1 mile or greater community distances without knee giving out.               EVAL:  Fear of knee giving out with full extension. Current: Met - the patient feels he will be able to               2.  Patient to have increased hip Abd/Ext strength to 5/5 to aid in stabilizing knee.              EVAL:  Right Hip Abd 4-/5, Hip Ext 3+/5              Current: Progressed to 4+/5 MMT hip ABD 11/01/2021              3.  Patient to demonstrate ability to eccentricly control descent on stairs.             EVAL:  Lacks eccentric control side steps              MLBGRIX: Pt unable to perform step downs on 4 inch step with control on right LE. 10/26/2021              4.  Patient to demonstrate ability to get off of floor using good body mechanics.             EVAL:  NA Patient reported goal to learn   97 [id number]. .  Patient to report increase in FOTO to 75 or greater for increase QOL.                EVAL: FOTO 64    PLAN  []  Upgrade activities as tolerated     [x]  Continue plan of care  []  Update interventions per flow sheet       []  Discharge due to:_  []  Other:_      Tim Husbands, PT 11/5/2021  10:14 AM    Future Appointments   Date Time Provider Ligia Norris   11/8/2021  3:00 PM Delma Fowler, PT [facility]   6/27/2022  8:00 AM Livier Stallings MD [facility]

## 2021-11-05 NOTE — PROGRESS NOTES
Per patient he did not have urine testing completed. He took the PSA order that Dr. Srinivasan Slater ordered at his last visit to Dr. Joce Escoto office. Dr. Caroline Montana ordered the CBC  and the PSA was drawn. Patient states he did not leave a urine specimen at that office.

## 2021-11-08 ENCOUNTER — HOSPITAL ENCOUNTER (OUTPATIENT)
Dept: PHYSICAL THERAPY | Age: 71
Discharge: HOME | End: 2021-11-08
Payer: MEDICARE

## 2021-11-08 LAB — SPECIMEN STATUS REPORT, ROLRST: NORMAL

## 2021-11-08 PROCEDURE — 97112 NEUROMUSCULAR REEDUCATION: CPT

## 2021-11-08 PROCEDURE — 97110 THERAPEUTIC EXERCISES: CPT

## 2021-11-08 NOTE — PROGRESS NOTES
PT DAILY TREATMENT NOTE     Patient Name: Que Vizcarra  Date:2021  : 1950  [x]  Patient  Verified  Payor: BLUE CROSS MEDICARE / [place] / Product Type: Managed Care Medicare /    In time: 2:59  Out time:3:42  Total Treatment Time (min): 43  Visit #: 8 of 8    Medicare/BCBS Only   Total Timed Codes (min):  43 1:1 Treatment Time:  43       Treatment Area: Knee pain, right [M25.561]  Left knee pain [M25.562]    SUBJECTIVE  Pain Level (0-10 scale): 0/10  Any medication changes, allergies to medications, adverse drug reactions, diagnosis change, or new procedure performed?: [x] No    [] Yes (see summary sheet for update)  Subjective functional status/changes:   [] No changes reported  The patient states that he has been feeling good. He denies pain, but states he is tight, \"but if that's the worse of it, I think I'm good. \" he does indicate that he is ready for discharge following the session. OBJECTIVE  28 min Therapeutic Exercise:  [x] See flow sheet :   Rationale: increase ROM and increase strength to improve the patients ability to improve ADL ease. 15 min Neuromuscular Re-education:  [x]  See flow sheet :   Rationale: improve coordination, improve balance and increase proprioception  to improve the patients ability to improve ADL ease. With   [] TE   [] TA   [] neuro   [] other: Patient Education: [x] Review HEP    [] Progressed/Changed HEP based on:   [] positioning   [] body mechanics   [] transfers   [] heat/ice application    [] other:      Other Objective/Functional Measures:   4\" step downs with good control. PT opted to not progress to 6\" step downs due to pressure of right knee reported by patient. FOTO: 69    Pain Level (0-10 scale) post treatment: 0/10    ASSESSMENT/Changes in Function: Overall, the patient has made excellent progress with PT. He has met or progressed towards all goals and has been updated with an HEP.  He leaves with all questions answered and denies pain upon departure. []  See Plan of Care  []  See progress note/recertification  []  See Discharge Summary         Progress towards goals / Updated goals:  Short Term Goals: To be accomplished in 2 weeks:              5.  Patient to be issued and independent with HEP.             EVAL: Issued HEP              GFUDQBU: Met reports compliance 10/14/2021              2.  Patient to demonstrate ability to SLS EO for 20+ seconds with each leg.              EVAL:  SLS Right 5 sec, Left 10 sec EO              Current: met, SLS right 30 sec, left 20 sec EO firm ground (10/19/2021)  Long Term Goals: To be accomplished in 4 weeks:              1.  Patient to ambulate 1 mile or greater community distances without knee giving out.               EVAL:  Fear of knee giving out with full extension.              Current: Met - the patient feels he will be able to               2.  Patient to have increased hip Abd/Ext strength to 5/5 to aid in stabilizing knee.              EVAL:  Right Hip Abd 4-/5, Hip Ext 3+/5              Current: Progressed to 4+/5 MMT hip ABD 11/01/2021              3.  Patient to demonstrate ability to eccentricly control descent on stairs.             EVAL:  Lacks eccentric control side steps              XEHEPNW: Pt unable to perform step downs on 4 inch step with control on right LE. 10/26/2021              4.  Patient to demonstrate ability to get off of floor using good body mechanics.             EVAL:  NA Patient reported goal to learn   97 [id number]. .  Patient to report increase in FOTO to 75 or greater for increase QOL.                EVAL: FOTO 64   Current: Progressed to 71    PLAN  []  Upgrade activities as tolerated     [x]  Continue plan of care  []  Update interventions per flow sheet       []  Discharge due to:_  []  Other:_      Jada Wolfe, PT 11/8/2021  3:04 PM    Future Appointments   Date Time Provider Ligia Norris   6/27/2022  8:00 AM Jeevan Cabrera MD [facility]

## 2021-11-08 NOTE — PROGRESS NOTES
[facility]  [address]  [place], [address]  [phone] fax    Physical Therapy Discharge Summary    Patient name: Manish Scott Start of Care: 10/13/2021   Referral source: Melissa Newby,* : 1950                Medical Diagnosis: Knee pain, right [M25.561]  Left knee pain [M25.562]  Payor: BLUE CROSS MEDICARE / Plan: [place] P[place] / Product Type: Managed Care Medicare /  Onset Date:May 2020                Treatment Diagnosis: Right greater than Left knee pain   Prior Hospitalization: see medical history Provider#: [id number]   Medications: Verified on Patient summary List    Comorbidities: OA, Hearing, , Luekopenia   Prior Level of Function: Independent, Active  Visits from Start of Care: 7    Missed Visits: 0  Reporting Period : 10/13/2021 to 2021    Summary of Care:  Short Term Goals: To be accomplished in 2 weeks:              1.  Patient to be issued and independent with HEP.             EVAL: Issued HEP              [id number]: Met reports compliance 10/14/2021              2.  Patient to demonstrate ability to SLS EO for 20+ seconds with each leg.              EVAL:  SLS Right 5 sec, Left 10 sec EO              Current: met, SLS right 30 sec, left 20 sec EO firm ground (10/19/2021)  Long Term Goals: To be accomplished in 4 weeks:              1.  Patient to ambulate 1 mile or greater community distances without knee giving out.               EVAL:  Fear of knee giving out with full extension.              Current: Met - the patient feels he will be able to               2.  Patient to have increased hip Abd/Ext strength to 5/5 to aid in stabilizing knee.              EVAL:  Right Hip Abd 4-/5, Hip Ext 3+/5              Current: Progressed to 4+/5 MMT hip ABD 2021              3.  Patient to demonstrate ability to eccentricly control descent on stairs.                EVAL:  Lacks eccentric control side steps              YLMCLES: Pt unable to perform step downs on 4 inch step with control on right LE. 10/26/2021              4.  Patient to demonstrate ability to get off of floor using good body mechanics.             EVAL:  NA Patient reported goal to learn   97 [id number]. .  Patient to report increase in FOTO to 75 or greater for increase QOL.             EVAL: FOTO 64              Current: Progressed to 71    ASSESSMENT/RECOMMENDATIONS: Overall, the patient has made excellent progress with PT. He has met or progressed towards all goals and has been updated with an HEP. He leaves with all questions answered and denies pain upon departure.    [x]Discontinue therapy: [x]Patient has reached or is progressing toward set goals      []Patient is non-compliant or has abdicated      []Due to lack of appreciable progress towards set [address], PT 11/8/2021 4:35 PM

## 2021-11-24 ENCOUNTER — DOCUMENTATION ONLY (OUTPATIENT)
Dept: FAMILY MEDICINE CLINIC | Age: 71
End: 2021-11-24

## 2021-12-08 ENCOUNTER — OFFICE VISIT (OUTPATIENT)
Dept: ORTHOPEDIC SURGERY | Age: 71
End: 2021-12-08
Payer: MEDICARE

## 2021-12-08 VITALS
WEIGHT: 170 LBS | BODY MASS INDEX: 23.8 KG/M2 | TEMPERATURE: 97.8 F | OXYGEN SATURATION: 98 % | HEART RATE: 72 BPM | HEIGHT: 71 IN

## 2021-12-08 DIAGNOSIS — M25.561 CHRONIC PAIN OF RIGHT KNEE: ICD-10-CM

## 2021-12-08 DIAGNOSIS — G89.29 CHRONIC PAIN OF RIGHT KNEE: ICD-10-CM

## 2021-12-08 DIAGNOSIS — S83.241A TEAR OF MEDIAL MENISCUS OF RIGHT KNEE, CURRENT, UNSPECIFIED TEAR TYPE, INITIAL ENCOUNTER: Primary | ICD-10-CM

## 2021-12-08 PROCEDURE — G8536 NO DOC ELDER MAL SCRN: HCPCS | Performed by: ORTHOPAEDIC SURGERY

## 2021-12-08 PROCEDURE — G8432 DEP SCR NOT DOC, RNG: HCPCS | Performed by: ORTHOPAEDIC SURGERY

## 2021-12-08 PROCEDURE — 1101F PT FALLS ASSESS-DOCD LE1/YR: CPT | Performed by: ORTHOPAEDIC SURGERY

## 2021-12-08 PROCEDURE — G8420 CALC BMI NORM PARAMETERS: HCPCS | Performed by: ORTHOPAEDIC SURGERY

## 2021-12-08 PROCEDURE — 73560 X-RAY EXAM OF KNEE 1 OR 2: CPT | Performed by: ORTHOPAEDIC SURGERY

## 2021-12-08 PROCEDURE — 99213 OFFICE O/P EST LOW 20 MIN: CPT | Performed by: ORTHOPAEDIC SURGERY

## 2021-12-08 PROCEDURE — 3017F COLORECTAL CA SCREEN DOC REV: CPT | Performed by: ORTHOPAEDIC SURGERY

## 2021-12-08 PROCEDURE — G8427 DOCREV CUR MEDS BY ELIG CLIN: HCPCS | Performed by: ORTHOPAEDIC SURGERY

## 2021-12-08 NOTE — PROGRESS NOTES
Dominique Toth  1950   Chief Complaint   Patient presents with    Knee Pain     right knee        HISTORY OF PRESENT ILLNESS  Dominique Toth is a 70 y.o. male who presents today for evaluation of b/l knee. R>L He rates his pain 3/10 today. At [address] on 10/7/2021, patient had a right knee cortisone injection which provided relief until 10/26/2021 when he was doing the [place]. His knee buckled and gave out which caused him to fall. Since then, the knee has been giving out with a \"crunch\". He also has a catching sensation but has not had the giving out sensation in the past couple of days. Pt does swim often as a form of exercise. Patient denies any fever, chills, chest pain, shortness of breath or calf pain. The remainder of the review of systems is negative. There are no new illness or injuries to report since last seen in the office. There are no changes to medications, allergies, family or social history. PHYSICAL EXAM:   Visit Vitals  Pulse 72   Temp 97.8 °F (36.6 °C) (Temporal)   Ht 5' 11\" (1.803 m)   Wt 170 lb (77.1 kg)   SpO2 98%   BMI 23.71 kg/m²     The patient is a well-developed, well-nourished male   in no acute distress. The patient is alert and oriented times three. The patient is alert and oriented times three. Mood and affect are normal.  LYMPHATIC: lymph nodes are not enlarged and are within normal limits  SKIN: normal in color and non tender to palpation. There are no bruises or abrasions noted. NEUROLOGICAL: Motor sensory exam is within normal limits. Reflexes are equal bilaterally.  There is normal sensation to pinprick and light touch  MUSCULOSKELETAL:  Examination Left knee Right knee   Skin Intact Intact   Range of motion 0-130 0-130   Effusion - +   Medial joint line tenderness - +   Lateral joint line tenderness - -   Tenderness Pes Bursa - -   Tenderness insertion MCL - -   Tenderness insertion LCL - -   Bks - +   Patella crepitus - +   Patella grind - +   Lachman - -   Pivot shift - -   Anterior drawer - -   Posterior drawer - -   Varus stress - -   Valgus stress - -   Neurovascular Intact Intact   Calf Swelling and Tenderness to Palpation - -   Rani's Test - -   Hamstring Cord Tightness - +       IMAGING: XR of the right knee with 2 views obtained in the office dated 12/8/2021 was reviewed and read by Dr. Lauro Bauman: Moderate degenerative changes in the lateral compartment and patellofemoral joint      XR of the right knee with 2 views obtained in the office dated 10/7/2021 was reviewed and read by Dr. Lauro Bauman: Marked degenerative changes in the patellofemoral joint    IMPRESSION:      ICD-10-CM ICD-9-CM    1. Tear of medial meniscus of right knee, current, unspecified tear type, initial encounter  S83.241A 836.0    2. Chronic pain of right knee  M25.561 719.46 AMB POC XRAY, KNEE; 1/2 VIEWS    G89.29 338.29         PLAN:  1. Pt presents today with right knee pain due to a possible meniscus tear and I would like to order a MRI. Return after MRI. Risk factors include: n/a  2. No ultrasound exam indicated today  3. No cortisone injection indicated today  4. No Physical/Occupational Therapy indicated today  5. Yes diagnostic test indicated today: R KNEE MRI   6. No durable medical equipment indicated today  7. No referral indicated today   8. No medications indicated today:   9. No Narcotic indicated today     RTC Following MRI       Scribed by Lennon Cowden [facility]) as dictated by Burton Amaro MD    I, Dr. Burton Amaro, confirm that all documentation is accurate.     Burton Amaro M.D.   [facility]

## 2021-12-16 ENCOUNTER — HOSPITAL ENCOUNTER (OUTPATIENT)
Age: 71
Discharge: HOME | End: 2021-12-16
Attending: ORTHOPAEDIC SURGERY
Payer: MEDICARE

## 2021-12-16 DIAGNOSIS — S83.241A TEAR OF MEDIAL MENISCUS OF RIGHT KNEE, CURRENT, UNSPECIFIED TEAR TYPE, INITIAL ENCOUNTER: ICD-10-CM

## 2021-12-16 PROCEDURE — 73721 MRI JNT OF LWR EXTRE W/O DYE: CPT

## 2021-12-23 ENCOUNTER — OFFICE VISIT (OUTPATIENT)
Dept: ORTHOPEDIC SURGERY | Age: 71
End: 2021-12-23
Payer: MEDICARE

## 2021-12-23 VITALS
HEART RATE: 58 BPM | OXYGEN SATURATION: 98 % | HEIGHT: 71 IN | WEIGHT: 184.4 LBS | BODY MASS INDEX: 25.81 KG/M2 | TEMPERATURE: 97.1 F

## 2021-12-23 DIAGNOSIS — S83.281A TEAR OF LATERAL MENISCUS OF RIGHT KNEE, CURRENT, UNSPECIFIED TEAR TYPE, INITIAL ENCOUNTER: Primary | ICD-10-CM

## 2021-12-23 PROCEDURE — G8510 SCR DEP NEG, NO PLAN REQD: HCPCS | Performed by: ORTHOPAEDIC SURGERY

## 2021-12-23 PROCEDURE — G8427 DOCREV CUR MEDS BY ELIG CLIN: HCPCS | Performed by: ORTHOPAEDIC SURGERY

## 2021-12-23 PROCEDURE — G8419 CALC BMI OUT NRM PARAM NOF/U: HCPCS | Performed by: ORTHOPAEDIC SURGERY

## 2021-12-23 PROCEDURE — 3017F COLORECTAL CA SCREEN DOC REV: CPT | Performed by: ORTHOPAEDIC SURGERY

## 2021-12-23 PROCEDURE — 1101F PT FALLS ASSESS-DOCD LE1/YR: CPT | Performed by: ORTHOPAEDIC SURGERY

## 2021-12-23 PROCEDURE — G8536 NO DOC ELDER MAL SCRN: HCPCS | Performed by: ORTHOPAEDIC SURGERY

## 2021-12-23 PROCEDURE — 99214 OFFICE O/P EST MOD 30 MIN: CPT | Performed by: ORTHOPAEDIC SURGERY

## 2021-12-23 NOTE — PROGRESS NOTES
Debbie Maria  1950   Chief Complaint   Patient presents with    Knee Pain     Right        HISTORY OF PRESENT ILLNESS  Debbie Maria is a 70 y.o. male who presents today for evaluation of b/l knee. R>L He rates his pain 3/10 today. At [address] on 10/7/2021, patient had a right knee cortisone injection which provided relief until 10/26/2021 when he was doing the [place]. His knee buckled and gave out which caused him to fall. Since then, the knee has been giving out with a \"crunch\". He also has a catching sensation but has not had the giving out sensation in the past couple of days. Pt does swim often as a form of exercise. Patient denies any fever, chills, chest pain, shortness of breath or calf pain. The remainder of the review of systems is negative. There are no new illness or injuries to report since last seen in the office. There are no changes to medications, allergies, family or social history. PHYSICAL EXAM:   Visit Vitals  Pulse (!) 58   Temp 97.1 °F (36.2 °C) (Temporal)   Ht 5' 11\" (1.803 m)   Wt 184 lb 6.4 oz (83.6 kg)   SpO2 98%   BMI 25.72 kg/m²     The patient is a well-developed, well-nourished male   in no acute distress. The patient is alert and oriented times three. The patient is alert and oriented times three. Mood and affect are normal.  LYMPHATIC: lymph nodes are not enlarged and are within normal limits  SKIN: normal in color and non tender to palpation. There are no bruises or abrasions noted. NEUROLOGICAL: Motor sensory exam is within normal limits. Reflexes are equal bilaterally.  There is normal sensation to pinprick and light touch  MUSCULOSKELETAL:  Examination Left knee Right knee   Skin Intact Intact   Range of motion 0-130 0-130   Effusion - +   Medial joint line tenderness - +   Lateral joint line tenderness - -   Tenderness Pes Bursa - -   Tenderness insertion MCL - -   Tenderness insertion LCL - -   Bks - +   Patella crepitus - +   Patella grind - + Lachman - -   Pivot shift - -   Anterior drawer - -   Posterior drawer - -   Varus stress - -   Valgus stress - -   Neurovascular Intact Intact   Calf Swelling and Tenderness to Palpation - -   Rani's Test - -   Hamstring Cord Tightness - +       IMAGING: MRI of the right knee dated 12/16/2021 was reviewed and read by Dr. Casey Mckeon:   IMPRESSION   1. Horizontal tear of the lateral meniscus body and anterior horn. 2. High-grade chondrosis in the patellofemoral compartment and anterior weightbearing lateral compartment. Small joint effusion. 3. Moderate pes anserinus bursitis. 4. Quadriceps insertional tendinosis. XR of the right knee with 2 views obtained in the office dated 12/8/2021 was reviewed and read by Dr. Casey Mckeon: Moderate degenerative changes in the lateral compartment and patellofemoral joint      XR of the right knee with 2 views obtained in the office dated 10/7/2021 was reviewed and read by Dr. Casey Mckeon: Marked degenerative changes in the patellofemoral joint    IMPRESSION:      ICD-10-CM ICD-9-CM    1. Tear of lateral meniscus of right knee, current, unspecified tear type, initial encounter  S83.281A 836.1         PLAN:  1. I discussed the risks and benefits and potential adverse outcomes of both operative vs non operative treatment of right knee lateral meniscus tear with the patient and patient wishes to proceed with right arthroscopic right knee partial lateral meniscectomy. Risks of operative intervention include but not limited to bleeding, infection, deep vein thrombosis, pulmonary embolism, death, limb length discrepancy, reflexive sympathetic dystrophy, fat embolism syndrome,damage to blood vessels and nerves, malunion, non-union, delayed union, failure of hardware, post traumatic arthritis, stroke, heart attack, and death. Patient understands that infection may arise and may require numerous surgeries.  The patient was counseled at length about the risks of demetrius Covid-19 during their perioperative period and any recovery window from their procedure. The patient was made aware that demetrius Covid-19  may worsen their prognosis for recovering from their procedure and lend to a higher morbidity and/or mortality risk. All material risks, benefits, and reasonable alternatives including postponing the procedure were discussed. The patient does  wish to proceed with the procedure at this time. History and physical exam to be preformed at a later date. Risk factors include: n/a  2. No ultrasound exam indicated today  3. No cortisone injection indicated today  4. No Physical/Occupational Therapy indicated today  5. No diagnostic test indicated today  6. No durable medical equipment indicated today  7. No referral indicated today   8. No medications indicated today:   9. No Narcotic indicated today      RTC H&P      Scribed by Romain Kendall [address]) as dictated by Rajesh Pfeiffer MD    I, Dr. Rajesh Pfeiffer, confirm that all documentation is accurate.     Rajesh Pfeiffer M.D.   [facility]

## 2021-12-27 ENCOUNTER — HOSPITAL ENCOUNTER (OUTPATIENT)
Dept: LAB | Age: 71
Discharge: HOME | End: 2021-12-27
Payer: MEDICARE

## 2021-12-27 DIAGNOSIS — Z01.818 PREOP EXAMINATION: Primary | ICD-10-CM

## 2021-12-27 DIAGNOSIS — Z01.818 PREOP EXAMINATION: ICD-10-CM

## 2021-12-27 LAB
ANION GAP SERPL CALC-SCNC: 1 MMOL/L (ref 3–18)
ATRIAL RATE: 47 BPM
BASOPHILS # BLD: 0 K/UL (ref 0–0.1)
BASOPHILS NFR BLD: 1 % (ref 0–2)
BUN SERPL-MCNC: 22 MG/DL (ref 7–18)
BUN/CREAT SERPL: 20 (ref 12–20)
CALCIUM SERPL-MCNC: 9.1 MG/DL (ref 8.5–10.1)
CALCULATED P AXIS, ECG09: 61 DEGREES
CALCULATED R AXIS, ECG10: -57 DEGREES
CALCULATED T AXIS, ECG11: -14 DEGREES
CHLORIDE SERPL-SCNC: 110 MMOL/L (ref 100–111)
CO2 SERPL-SCNC: 31 MMOL/L (ref 21–32)
CREAT SERPL-MCNC: 1.12 MG/DL (ref 0.6–1.3)
DIAGNOSIS, 93000: NORMAL
DIFFERENTIAL METHOD BLD: ABNORMAL
EOSINOPHIL # BLD: 0.1 K/UL (ref 0–0.4)
EOSINOPHIL NFR BLD: 1 % (ref 0–5)
ERYTHROCYTE [DISTWIDTH] IN BLOOD BY AUTOMATED COUNT: 12.2 % (ref 11.6–14.5)
GLUCOSE SERPL-MCNC: 108 MG/DL (ref 74–99)
HCT VFR BLD AUTO: 39.3 % (ref 36–48)
HGB BLD-MCNC: 13.3 G/DL (ref 13–16)
IMM GRANULOCYTES # BLD AUTO: 0 K/UL (ref 0–0.04)
IMM GRANULOCYTES NFR BLD AUTO: 1 % (ref 0–0.5)
LYMPHOCYTES # BLD: 1.1 K/UL (ref 0.9–3.6)
LYMPHOCYTES NFR BLD: 26 % (ref 21–52)
MCH RBC QN AUTO: 31.4 PG (ref 24–34)
MCHC RBC AUTO-ENTMCNC: 33.8 G/DL (ref 31–37)
MCV RBC AUTO: 92.7 FL (ref 78–100)
MONOCYTES # BLD: 0.5 K/UL (ref 0.05–1.2)
MONOCYTES NFR BLD: 10 % (ref 3–10)
NEUTS SEG # BLD: 2.7 K/UL (ref 1.8–8)
NEUTS SEG NFR BLD: 62 % (ref 40–73)
NRBC # BLD: 0 K/UL (ref 0–0.01)
NRBC BLD-RTO: 0 PER 100 WBC
P-R INTERVAL, ECG05: 194 MS
PLATELET # BLD AUTO: 221 K/UL (ref 135–420)
PMV BLD AUTO: 8.3 FL (ref 9.2–11.8)
POTASSIUM SERPL-SCNC: 4 MMOL/L (ref 3.5–5.5)
Q-T INTERVAL, ECG07: 490 MS
QRS DURATION, ECG06: 154 MS
QTC CALCULATION (BEZET), ECG08: 433 MS
RBC # BLD AUTO: 4.24 M/UL (ref 4.35–5.65)
SODIUM SERPL-SCNC: 142 MMOL/L (ref 136–145)
VENTRICULAR RATE, ECG03: 47 BPM
WBC # BLD AUTO: 4.4 K/UL (ref 4.6–13.2)

## 2021-12-27 PROCEDURE — 93005 ELECTROCARDIOGRAM TRACING: CPT

## 2021-12-27 PROCEDURE — 80048 BASIC METABOLIC PNL TOTAL CA: CPT

## 2021-12-27 PROCEDURE — 36415 COLL VENOUS BLD VENIPUNCTURE: CPT

## 2021-12-27 PROCEDURE — 85025 COMPLETE CBC W/AUTO DIFF WBC: CPT

## 2022-01-03 ENCOUNTER — OFFICE VISIT (OUTPATIENT)
Dept: ORTHOPEDIC SURGERY | Age: 72
End: 2022-01-03

## 2022-01-03 ENCOUNTER — OFFICE VISIT (OUTPATIENT)
Dept: CARDIOLOGY CLINIC | Age: 72
End: 2022-01-03
Payer: MEDICARE

## 2022-01-03 VITALS
WEIGHT: 181 LBS | HEART RATE: 68 BPM | HEIGHT: 71 IN | BODY MASS INDEX: 25.34 KG/M2 | OXYGEN SATURATION: 99 % | SYSTOLIC BLOOD PRESSURE: 146 MMHG | DIASTOLIC BLOOD PRESSURE: 74 MMHG

## 2022-01-03 VITALS
HEART RATE: 55 BPM | HEIGHT: 71 IN | WEIGHT: 181.2 LBS | SYSTOLIC BLOOD PRESSURE: 135 MMHG | OXYGEN SATURATION: 97 % | BODY MASS INDEX: 25.37 KG/M2 | DIASTOLIC BLOOD PRESSURE: 85 MMHG | TEMPERATURE: 97.1 F

## 2022-01-03 DIAGNOSIS — R94.31 ABNORMAL EKG: Primary | ICD-10-CM

## 2022-01-03 DIAGNOSIS — S83.281A TEAR OF LATERAL MENISCUS OF RIGHT KNEE, CURRENT, UNSPECIFIED TEAR TYPE, INITIAL ENCOUNTER: Primary | ICD-10-CM

## 2022-01-03 PROCEDURE — 1101F PT FALLS ASSESS-DOCD LE1/YR: CPT | Performed by: INTERNAL MEDICINE

## 2022-01-03 PROCEDURE — 99204 OFFICE O/P NEW MOD 45 MIN: CPT | Performed by: INTERNAL MEDICINE

## 2022-01-03 PROCEDURE — 3017F COLORECTAL CA SCREEN DOC REV: CPT | Performed by: INTERNAL MEDICINE

## 2022-01-03 PROCEDURE — G8428 CUR MEDS NOT DOCUMENT: HCPCS | Performed by: INTERNAL MEDICINE

## 2022-01-03 PROCEDURE — G8510 SCR DEP NEG, NO PLAN REQD: HCPCS | Performed by: INTERNAL MEDICINE

## 2022-01-03 PROCEDURE — G8536 NO DOC ELDER MAL SCRN: HCPCS | Performed by: INTERNAL MEDICINE

## 2022-01-03 PROCEDURE — G8419 CALC BMI OUT NRM PARAM NOF/U: HCPCS | Performed by: INTERNAL MEDICINE

## 2022-01-03 RX ORDER — HYDROCODONE BITARTRATE AND ACETAMINOPHEN 7.5; 325 MG/1; MG/1
1 TABLET ORAL
Qty: 40 TABLET | Refills: 0 | Status: SHIPPED | OUTPATIENT
Start: 2022-01-03 | End: 2022-01-10

## 2022-01-03 RX ORDER — ONDANSETRON 4 MG/1
4 TABLET, FILM COATED ORAL
Qty: 60 TABLET | Refills: 0 | Status: SHIPPED | OUTPATIENT
Start: 2022-01-03 | End: 2022-06-27

## 2022-01-03 NOTE — PROGRESS NOTES
HISTORY AND PHYSICAL          Patient: Daniel Jeronimo                MRN: [id number]       SSN: [id number]  YOB: 1950          AGE: 70 y.o. SEX: male      Patient scheduled for:  Right knee arthroscopic partial lateral menisectomy    Surgeon: Corinne Duong MD    ANESTHESIA TYPE:  General    HISTORY:     The patient was seen in the office today for a preoperative history and physical for an upcoming above listed surgery. The patient is a pleasant 70 y.o. male who has a history of right knee pain. on 10/7/2021, patient had a right knee cortisone injection which provided relief until 10/26/2021 when he was doing the [place]. His knee buckled and gave out which caused him to fall. Since then, the knee has been giving out with a \"crunch\". He also has a catching sensation but has not had the giving out sensation in the past couple of days. Pt does swim often as a form of exercise. Pain level is a 1/10. Due to the current findings, affected activity of daily living and continued pain and discomfort, surgical intervention is indicated. The alternatives, risks, and complications, including but not limited to infection, blood loss, need for blood transfusion, neurovascular damage, diane-incisional numbness, subcutaneous hematoma, bone fracture, anesthetic complications, DVT, PE, death, RSD, postoperative stiffness and pain, possible surgical scar, delayed healing and nonhealing, reflexive sympathetic dystrophy, damage to blood vessels and nerves, need for more surgery, MI, and stroke,  failure of hardware, gait disturbances,have been discussed. The patient understands and wishes to proceed with surgery. PAST MEDICAL HISTORY:     Past Medical History:   Diagnosis Date    Agatston coronary artery calcium score less than 100 2/10/2014    Coronary calcium score of 82.     Arthritis     right shoulder, SC joint, knees    Bilateral knee pain     BPH (benign prostatic hyperplasia) has tried avodart and flomax unsuccessfully in the 90s.  ED (erectile dysfunction)     Giant cell granuloma     oral, lower inner gingiva    H/O colonoscopy 3/2005 / 03- / 9/2020    6mm polyp every 5 years based on pathology report    Hearing disorder, sensorineural 1/28/2014    Using aides    History of colonoscopy with polypectomy 09/02/2020    Dr. David Mistry; internal hemorrhoids, mild diverticulosis,polyp(adenoma); f/u colonoscopy in 5 years    Hypercholesteremia     Neutropenia ([address] 2016    Sees heme/onc    Onychomycosis     toenails    Pseudoexfoliation (PXF) glaucoma of both eyes 2021       CURRENT MEDICATIONS:     Current Outpatient Medications   Medication Sig Dispense Refill    tadalafiL (Cialis) 5 mg tablet Take 1 Tablet by mouth daily. 180 Tablet 1    brimonidine-timoloL (COMBIGAN) 0.2-0.5 % drop ophthalmic solution 1 Drop every twelve (12) hours.  latanoprost (XALATAN) 0.005 % ophthalmic solution INSTILL 1 DROP INTO BOTH EYES AT BEDTIME      ketorolac (ACULAR) 0.5 % ophthalmic solution INSTILL 1 DROP INTO BOTH EYES FOUR TIMES A DAY      ascorbic acid (VITAMIN C PO) Take 1 Tablet by mouth daily.  CALCIUM ACETATE PO Take 1 Tablet by mouth daily.  cetirizine (ZYRTEC) 10 mg tablet Take 10 mg by mouth daily as needed.  ibuprofen (MOTRIN IB) 200 mg tablet Take 400 mg by mouth as needed for Pain.  KRILL OIL PO Take 1 capsule by mouth daily.  GLUCOSAMINE/CHONDROITIN SULF A (GLUCOSAMINE-CHONDROITIN PO) Take 1,500 mg by mouth daily.  BENEFIBER, GUAR GUM, PO Take  by mouth. 1 tbsp daily      multivitamin (ONE A DAY) tablet Take 1 Tab by mouth daily.  Cholecalciferol, Vitamin D3, (VITAMIN D3) 1,000 unit cap Take 1 Tablet by mouth daily.          ALLERGIES:     Allergies   Allergen Reactions    Benzocaine Swelling    Codeine Nausea Only         SURGICAL HISTORY:     Past Surgical History:   Procedure Laterality Date    HX CATARACT REMOVAL      HX ORTHOPAEDIC Right 1980    plantar fasciitis, neurofibroma?  HX OTHER SURGICAL  2008    peripheral giant cell granuloma removal     HX OTHER SURGICAL Right 02/15/2021    eyesurgery for dislocated intra-ocular lens    HX REFRACTIVE SURGERY Right 02/15/2021    HX TURP  2009    for BPH     HX VASECTOMY  1984       SOCIAL HISTORY:     Social History     Socioeconomic History    Marital status:    Occupational History    Occupation: retired      Employer: RETIRED   Tobacco Use    Smoking status: Never Smoker    Smokeless tobacco: Never Used   Vaping Use    Vaping Use: Never used   Substance and Sexual Activity    Alcohol use: Yes     Alcohol/week: 1.7 standard drinks     Types: 2 Standard drinks or equivalent per week    Drug use: No    Sexual activity: Yes     Partners: Female       FAMILY HISTORY:     Family History   Problem Relation Age of Onset    Depression Mother     Alcohol abuse Mother     Other Mother         tobacco use    Breast Cancer Mother     OSTEOARTHRITIS Mother     Cancer Mother 80        skin/pancreatic     Alcohol abuse Brother     Other Brother         tobacco use    Other Father         tobacco use    Hypertension Father     OSTEOARTHRITIS Father     Lung Cancer Father        REVIEW OF SYSTEMS:     Negative for fevers, chills, chest pain, shortness of breath, weight loss, recent illness     General: Negative for fever and chills. No unexpected change in weight. Denies fatigue. No change in appetite. Skin: Negative for rash or itching. HEENT: Negative for congestion, sore throat, neck pain and neck stiffness. No change in vision or hearing. Hasn't noted any enlarged lymph nodes in the neck. Cardiovascular:  Negative for chest pain and palpitations. Has not noted pedal edema. Respiratory: Negative for cough, colds, sinus, hemoptysis, shortness of breath and wheezing.   Gastrointestinal: Negative for nausea and vomiting, rectal bleeding, coffee ground emesis, abdominal pain, diarrhea and constipation. Genitourinary: Negative for dysuria, frequency urgency, or burning on micturition. No flank pain, no foul smelling urine, no difficulty with initiating urination. Hematological: Negative for bleeding or easy bruising. Musculoskeletal: Negative  for arthralgias, back pain or neck pain. Neurological: Negative for dizziness, seizures or syncopal episodes. Denies headaches. Endocrine: Denies excessive thirst.  No heat/cold intolerance. Psychiatric: Negative for depression or insomnia. PHYSICAL EXAMINATION:     VITALS:   Visit Vitals  /85 (BP 1 Location: Right arm, BP Patient Position: Sitting, BP Cuff Size: Adult)   Pulse (!) 55   Temp 97.1 °F (36.2 °C) (Temporal)   Ht 5' 11\" (1.803 m)   Wt 181 lb 3.2 oz (82.2 kg)   SpO2 97%   BMI 25.27 kg/m²     GEN:  Well developed, well nourished 70 y.o. male in no acute distress. HEENT: Normocephalic and atraumatic. Eyes: Conjunctivae and EOM are normal.Pupils are equal, round, and reactive to light. External ear normal appearance, external nose normal appearing. Mouth/Throat: Oropharynx is clear and moist, able to handle oral secretions w/out difficulty, airway patent  NECK: Supple. Normal ROM, No lymphadenopathy. Trachea is midline. No bruising, swelling or deformity  RESP: Clear to auscultation bilaterally. No wheezes, rales, rhonchi. Normal effort and breath sounds. No respiratory distress  CARDIO:  Normal rate, regular rhythm and normal heart sounds. No MGR. ABDOMEN: Soft, non-tender, non-distended, normoactive bowel sounds in all four quadrants. There is no tenderness. There is no rebound and no guarding.    BACK: No CVA or spinal tenderness  BREAST:  Deferred  PELVIC:    Deferred   RECTAL:  Deferred   :           Deferred  EXTREMITIES: EXAMINATION OF: right knee  Examination Right knee   Skin Intact   Range of motion 0-120   Effusion -   Medial joint line tenderness -   Lateral joint line tenderness + Tenderness Pes Bursa -   Tenderness insertion MCL -   Tenderness insertion LCL -   Bks +   Patella crepitus -   Patella grind -   Lachman -   Pivot shift -   Anterior drawer -   Posterior drawer -   Varus stress -   Valgus stress -   Neurovascular Intact   Calf Swelling and Tenderness to Palpation -   Rani's Test -   Hamstring Cord Tightness -       NEUROVASCULAR: Sensation intact to light touch and strength grossly intact and symmetrical. No nystagmus. Positive distal pulses and capillary refill. DVT ASSESSMENT:  There is not  calf tenderness. No evidence of DVT seen on physical exam.  MOTOR: In tact  PSYCH: Alert an oriented to person, place and time. Mood, memory, affect, behavior and judgment normal       RADIOGRAPHS & DIAGNOSTIC STUDIES:     MRI/xray reveals : MRI of the right knee dated 12/16/2021 was reviewed and read by Dr. Abilio Lan:   IMPRESSION   1. Horizontal tear of the lateral meniscus body and anterior horn. 2. High-grade chondrosis in the patellofemoral compartment and anterior weightbearing lateral compartment. Small joint effusion. 3. Moderate pes anserinus bursitis. 4. Quadriceps insertional tendinosis.        XR of the right knee with 2 views obtained in the office dated 12/8/2021 was reviewed and read by Dr. Abilio Lan:  Moderate degenerative changes in the lateral compartment and patellofemoral joint       XR of the right knee with 2 views obtained in the office dated 10/7/2021 was reviewed and read by Dr. Abilio Lan: Marked degenerative changes in the patellofemoral joint      LABS:       @  CBC:   Lab Results   Component Value Date/Time    WBC 4.4 (L) 12/27/2021 03:06 PM    RBC 4.24 (L) 12/27/2021 03:06 PM    HGB 13.3 12/27/2021 03:06 PM    HCT 39.3 12/27/2021 03:06 PM    PLATELET 139 85/49/5722 03:06 PM    and BMP:   Lab Results   Component Value Date/Time    Glucose 108 (H) 12/27/2021 03:06 PM    Sodium 142 12/27/2021 03:06 PM    Potassium 4.0 12/27/2021 03:06 PM    Chloride 110 12/27/2021 03:06 PM    CO2 31 12/27/2021 03:06 PM    BUN 22 (H) 12/27/2021 03:06 PM    Creatinine 1.12 12/27/2021 03:06 PM    Calcium 9.1 12/27/2021 03:06 PM   @    Preoperative labs were reviewed and are substantially within normal limits   EKG:   Marked sinus bradycardia   Right bundle branch block   Left anterior fascicular block   Bifascicular block   Cannot rule out Inferior infarct (masked by fascicular block?) , age   undetermined   Abnormal ECG   No previous ECGs available   Confirmed by Mami Devine ([phone]) on 12/27/2021 3:35:26 PM                          ASSESSMENT:       Encounter Diagnosis   Name Primary?  Tear of lateral meniscus of right knee, current, unspecified tear type, initial encounter Yes       PLAN:     Again, the alternatives, risks, and complications, as well as expected outcome were discussed. The patient understands and agrees to proceed with right knee arthroscopic partial lateral menisectomy. The patient was counseled at length about the risks of demetrius Covid-19 during their perioperative period and any recovery window from their procedure. The patient was made aware that demetrius Covid-19  may worsen their prognosis for recovering from their procedure and lend to a higher morbidity and/or mortality risk. All material risks, benefits, and reasonable alternatives including postponing the procedure were discussed. The patient does  wish to proceed with the procedure at this time. Patient given orders listed below:    No orders of the defined types were placed in this encounter.         Mere Hill PA-C  1/3/2022  9:58 AM

## 2022-01-03 NOTE — LETTER
1/3/2022    Patient: Yves Luna   YOB: 1950   Date of Visit: 1/3/2022     Latisha Boo MD  [address] [address]  [facility] [address]    Dear Latisha Boo MD,      Thank you for referring Mr. Margy Green to [address] [place] [address] for evaluation. My notes for this consultation are attached. If you have questions, please do not hesitate to call me. I look forward to following your patient along with you.       Sincerely,    Marcy Wesley MD

## 2022-01-03 NOTE — PROGRESS NOTES
[facility]    Mr. Zhao Warner is 66-year-old male with a history of DJD, borderline hypertension    Patient is here today for cardiac evaluation. He denies any prior history of MI or CHF. Patient was asked to come see me prior to having elective right knee arthroscopic meniscal repair. Patient has longstanding history of DJD and has been having some knee pain so he is undergoing this surgery this week. He denies any cardiac symptoms. He denies any chest pain or chest tightness. Patient has been very athletic throughout his life. He used to run marathons 26 mile regularly in the past.  He always has resting heart rate in 40s and 50s. He never had presyncope or syncope. Patient had a EKG which was abnormal so she was asked to come see me. Given as of today, patient perform exercise almost on a daily basis. He swim up to a mile without any symptoms to suggest angina or heart failure. He also perform elliptical exercise 3 times a week. He denies fatigue tiredness shortness of breath or any chest pain or chest tightness  Denies any nausea, vomiting, abdominal pain, fever, chills, sputum production. No hematuria or other bleeding complaints    Past Medical History:   Diagnosis Date    Agatston coronary artery calcium score less than 100 2/10/2014    Coronary calcium score of 82.  Arthritis     right shoulder, SC joint, knees    Bilateral knee pain     BPH (benign prostatic hyperplasia)     has tried avodart and flomax unsuccessfully in the 90s.     ED (erectile dysfunction)     Giant cell granuloma     oral, lower inner gingiva    H/O colonoscopy 3/2005 / 03- / 9/2020    6mm polyp every 5 years based on pathology report    Hearing disorder, sensorineural 1/28/2014    Using aides    History of colonoscopy with polypectomy 09/02/2020    Dr. Malik Sirclarence; internal hemorrhoids, mild diverticulosis,polyp(adenoma); f/u colonoscopy in 5 years    Hypercholesteremia     Neutropenia ([facility] [address] 2016    Sees heme/onc    Onychomycosis     toenails    Pseudoexfoliation (PXF) glaucoma of both eyes 2021       Review of Systems:  Cardiac symptoms as noted above in HPI. All others negative. Denies fatigue, malaise, skin rash, joint pain, blurring vision, photophobia, neck pain, hemoptysis, chronic cough, nausea, vomiting, hematuria, burning micturition, BRBPR, chronic headaches. Current Outpatient Medications   Medication Sig    tadalafiL (Cialis) 5 mg tablet Take 1 Tablet by mouth daily.  brimonidine-timoloL (COMBIGAN) 0.2-0.5 % drop ophthalmic solution 1 Drop every twelve (12) hours.  latanoprost (XALATAN) 0.005 % ophthalmic solution INSTILL 1 DROP INTO BOTH EYES AT BEDTIME    ketorolac (ACULAR) 0.5 % ophthalmic solution INSTILL 1 DROP INTO BOTH EYES FOUR TIMES A DAY    ascorbic acid (VITAMIN C PO) Take 1 Tablet by mouth daily.  CALCIUM ACETATE PO Take 1 Tablet by mouth daily.  cetirizine (ZYRTEC) 10 mg tablet Take 10 mg by mouth daily as needed.  ibuprofen (MOTRIN IB) 200 mg tablet Take 400 mg by mouth as needed for Pain.  KRILL OIL PO Take 1 capsule by mouth daily.  GLUCOSAMINE/CHONDROITIN SULF A (GLUCOSAMINE-CHONDROITIN PO) Take 1,500 mg by mouth daily.  BENEFIBER, GUAR GUM, PO Take  by mouth. 1 tbsp daily    multivitamin (ONE A DAY) tablet Take 1 Tab by mouth daily.  Cholecalciferol, Vitamin D3, (VITAMIN D3) 1,000 unit cap Take 1 Tablet by mouth daily. No current facility-administered medications for this visit. Past Surgical History:   Procedure Laterality Date    HX CATARACT REMOVAL      HX ORTHOPAEDIC Right 1980    plantar fasciitis, neurofibroma?     HX OTHER SURGICAL  2008    peripheral giant cell granuloma removal     HX OTHER SURGICAL Right 02/15/2021    eyesurgery for dislocated intra-ocular lens    HX REFRACTIVE SURGERY Right 02/15/2021    HX TURP  2009    for BPH     HX VASECTOMY  1984 Allergies and Sensitivities:  Allergies   Allergen Reactions    Benzocaine Swelling    Codeine Nausea Only       Family History:  Family History   Problem Relation Age of Onset    Depression Mother     Alcohol abuse Mother     Other Mother         tobacco use    Breast Cancer Mother     OSTEOARTHRITIS Mother     Cancer Mother 80        skin/pancreatic     Alcohol abuse Brother     Other Brother         tobacco use    Other Father         tobacco use    Hypertension Father     OSTEOARTHRITIS Father     Lung Cancer Father        Social History:  Social History     Tobacco Use    Smoking status: Never Smoker    Smokeless tobacco: Never Used   Substance Use Topics    Alcohol use: Yes     Alcohol/week: 1.7 standard drinks     Types: 2 Standard drinks or equivalent per week    Drug use: No     He  reports that he has never smoked. He has never used smokeless tobacco.  He  reports current alcohol use of about 1.7 standard drinks of alcohol per week. Physical Exam:  BP Readings from Last 3 Encounters:   01/03/22 (!) 146/74   06/24/21 110/70   05/19/21 130/72         Pulse Readings from Last 3 Encounters:   01/03/22 68   12/23/21 (!) 58   12/08/21 72          Wt Readings from Last 3 Encounters:   01/03/22 82.1 kg (181 lb)   12/23/21 83.6 kg (184 lb 6.4 oz)   12/08/21 77.1 kg (170 lb)       Constitutional: Oriented to person, place, and time. HENT: Head: Normocephalic and atraumatic. Eyes: Conjunctivae and extraocular motions are normal.   Neck: No JVD present. Carotid bruit is not appreciated. Cardiovascular: Regular rhythm. No murmur, gallop or rubs appreciated  Lung: Breath sounds normal. No respiratory distress. No ronchi or rales appreciated  Abdominal: No tenderness. No rebound and no guarding. Musculoskeletal: There is no lower extremity edema. No cynosis  Lymphadenopathy:  No cervical or supraclavicular adenopathy appriciated. Neurological: No gross motor deficit noted.   Skin: No visible skin rash noted. No Ear discharge noted  Psychiatric: Normal mood and affect. LABS:   @  Lab Results   Component Value Date/Time    WBC 4.4 (L) 2021 03:06 PM    HGB 13.3 2021 03:06 PM    HCT 39.3 2021 03:06 PM    PLATELET 195  03:06 PM    MCV 92.7 2021 03:06 PM     Lab Results   Component Value Date/Time    Sodium 142 2021 03:06 PM    Potassium 4.0 2021 03:06 PM    Chloride 110 2021 03:06 PM    CO2 31 2021 03:06 PM    Glucose 108 (H) 2021 03:06 PM    BUN 22 (H) 2021 03:06 PM    Creatinine 1.12 2021 03:06 PM     No flowsheet data found. Lab Results   Component Value Date/Time    ALT (SGPT) 23 2016 09:00 AM     No results found for: HBA1C, JNQ0KFRS, MZA0NQMK  No results found for: TSH, TSH2, TSH3, TSHP, TSHEXT    EK2021: Sinus bradycardia at 47 bpm.  Left axis deviation. Right bundle branch block. STRESS TEST (EST, PHARM, NUC, ECHO etc)    CATHETERIZATION    IMPRESSION & PLAN:  Mr. Dotty Gallegos is 51-year-old male    Borderline hypertension:  At home blood pressure usually 803 systolic or less. Nonpharmacologic intervention of blood pressure management discussed. Abnormal EKG:  EKG shows right bundle branch block and left anterior fascicular block. He has sinus bradycardia. According to patient he has been athletic throughout his life. He performs exercise regularly. His heart rate always has been running 45-55 bpm.  \No symptoms related to bradycardia. Will order echocardiogram to rule out any abnormal cardiac structure and to establish any baseline    Preoperative evaluation:  Patient is scheduled to have a arthroscopic right knee meniscal repair for DJD and knee pain  Patient is exercising regularly. His functional status is more than 4 METS. He has no cardiac symptoms to report whatsoever. He has no symptoms to suggest unstable angina or decompensated heart failure.   EKG showed sinus bradycardia with right bundle branch block. His EKG from 02/20/2021 reviewed. Copy was obtained from patient. He has chronic right bundle branch block. In absence of any cardiac symptoms and very good functional status, I do not believe he needs any further cardiac work-up at this time. Is acceptable candidate. This plan was discussed with patient who is in agreement. Thank you for allowing me to participate in patient care. Please feel free to call me if you have any question or concern. Kristi Lesch, MD  Please note: This document has been produced using voice recognition software. Unrecognized errors in transcription may be present.

## 2022-01-03 NOTE — PATIENT INSTRUCTIONS
Dr. Zhao Park Knee Arthroscopy Surgery    What is the surgery? - This is an outpatient procedure at either [place] [address] or [address] [place], [place] [address]chema Maria will be completely asleep for procedure. Dr. Zhao Park will make 2 small incisions in your knee. He will take a tour of your knee with the camera and then address the meniscal tear(s). We will be able to evaluate if any arthritis in your knee but this surgery is not to treat your arthritis. - Total surgery takes about 25-30 mins     What can you expect after surgery? - You will have a bulky dressing on your knee that you can remove 2 days after surgery. You will be able to shower 2 days after surgery but no soaking in a bath, hot tub, ocean or pool x 2 weeks to allow for full wound healing. No special brace is needed. - You will be on crutches or a walker when you leave the hospital. You can place weight on your leg as tolerated starting immediately. You are usually on crutches or your walker for about 4-5 days.   - Even though you can place weight on your leg, we recommend no walking or standing longer than 10mins for the first week. We will gradually increase your activities after that point.    - Dr. Zhao Park will start physical therapy for you when you return for your 1 week post op apt  - It will take your 6-8 weeks to fully recover from your surgery. When can I return to work? - Most patients return to desk work only after 1-2 weeks. We recommend no prolonged walking or standing, climbing, kneeling, or crawling x 6-8 weeks. Not all knee arthroscopies are the same. The specifics of your individual case will be discussed at length with you by Dr. Zhao Park and his Physician Assistant. Malinda Fulton  Surgical Coordinator  [address] [facility] [address]. [place].  [address], [facility] [place]annan  [email or web address]  P: [phone]  F: [phone]

## 2022-01-20 ENCOUNTER — OFFICE VISIT (OUTPATIENT)
Dept: ORTHOPEDIC SURGERY | Age: 72
End: 2022-01-20
Payer: MEDICARE

## 2022-01-20 VITALS — HEIGHT: 71 IN | BODY MASS INDEX: 25.27 KG/M2 | RESPIRATION RATE: 14 BRPM | TEMPERATURE: 97.2 F

## 2022-01-20 DIAGNOSIS — S83.281A TEAR OF LATERAL MENISCUS OF RIGHT KNEE, CURRENT, UNSPECIFIED TEAR TYPE, INITIAL ENCOUNTER: Primary | ICD-10-CM

## 2022-01-20 DIAGNOSIS — M17.11 PRIMARY OSTEOARTHRITIS OF RIGHT KNEE: ICD-10-CM

## 2022-01-20 PROCEDURE — 99024 POSTOP FOLLOW-UP VISIT: CPT | Performed by: PHYSICIAN ASSISTANT

## 2022-01-20 NOTE — PATIENT INSTRUCTIONS
You may now shower and get your incisions wet. We recommend starting scar massage now to your incision(s). Take Vitamin E, Cocoa Butter or Scar Cream and massage the incision 2 times a day. This will help soften your incisions and help de-sensitize the skin as the nerves \"wake up\".

## 2022-01-20 NOTE — PROGRESS NOTES
Patient: Adrian Matos  YOB: 1950       HISTORY:  The patient presents for reevaluation of his left knee status post arthroscopic partial lateral menisectomy with degenerative arthritis with joint space narrowing on 1/6/22. Patient is improved, states pain is a 0 out of 10.  he has not gone to physical therapy. Patient denies any fever, chills, chest pain, shortness of breath or calf pain. The remainder of the review of systems is negative. There are no new illness or injuries to report since last seen in the office. No changes in medications, allergies, social or family history. PHYSICAL EXAMINATION:    Visit Vitals  Temp 97.2 °F (36.2 °C)   Resp 14   Ht 5' 11\" (1.803 m)   BMI 25.27 kg/m²     The patient is a well-developed, well-nourished male in no acute distress. The patient is alert and oriented times three. The patient appears to be well groomed. Mood and affect are normal.   ORTHOPEDIC EXAM of Left knee: Inspection: Effusion not present,  incisions clean, dry intact, sutures in place  TTP: lateral joint line  Range of motion: 0-100 flexion  Stability: Stable  Strength: 5/5  2+ distal pulses    IMPRESSION:  Status post Left knee arthroscopic partial lateral menisectomy. PLAN: Incisions cleaned. Surgery was discussed at length today. Stressed to patient that nothing causes an increase in pain or swelling. Patient is weight bearing as tolerated. He will do his home exercises. Will also auth euflexxa given degenerative arthritis with joint space narrowing.     RTC when he returns from [place], [facility]

## 2022-01-28 ENCOUNTER — DOCUMENTATION ONLY (OUTPATIENT)
Dept: ORTHOPEDIC SURGERY | Age: 72
End: 2022-01-28

## 2022-01-28 NOTE — PROGRESS NOTES
-Called pt to schedule, he states he is going to wait on these injections to see if he heals from surgery first well enough in the hopes he doesn't need the injections

## 2022-03-24 ENCOUNTER — TELEPHONE (OUTPATIENT)
Dept: FAMILY MEDICINE CLINIC | Age: 72
End: 2022-03-24

## 2022-03-25 ENCOUNTER — TELEPHONE (OUTPATIENT)
Dept: CARDIOLOGY CLINIC | Age: 72
End: 2022-03-25

## 2022-03-29 ENCOUNTER — TELEPHONE (OUTPATIENT)
Dept: CARDIOLOGY CLINIC | Age: 72
End: 2022-03-29

## 2022-03-29 NOTE — TELEPHONE ENCOUNTER
----- Message from Zeus Shin MD sent at 3/29/2022  9:27 AM EDT -----  Cardiac testing appears normal.  Inform patient please    Thanks  SP

## 2022-03-31 ENCOUNTER — OFFICE VISIT (OUTPATIENT)
Dept: FAMILY MEDICINE CLINIC | Age: 72
End: 2022-03-31
Payer: MEDICARE

## 2022-03-31 VITALS
RESPIRATION RATE: 14 BRPM | BODY MASS INDEX: 25.76 KG/M2 | DIASTOLIC BLOOD PRESSURE: 80 MMHG | OXYGEN SATURATION: 98 % | HEART RATE: 63 BPM | TEMPERATURE: 98.9 F | WEIGHT: 184 LBS | HEIGHT: 71 IN | SYSTOLIC BLOOD PRESSURE: 146 MMHG

## 2022-03-31 DIAGNOSIS — Z01.818 PREOPERATIVE CLEARANCE: ICD-10-CM

## 2022-03-31 DIAGNOSIS — T85.22XS DISLOCATION OF INTRAOCULAR LENS, SEQUELA: Primary | ICD-10-CM

## 2022-03-31 DIAGNOSIS — Z01.818 PRE-OP EXAM: ICD-10-CM

## 2022-03-31 DIAGNOSIS — R03.0 ELEVATED BLOOD PRESSURE READING: ICD-10-CM

## 2022-03-31 DIAGNOSIS — R94.31 EKG ABNORMALITIES: ICD-10-CM

## 2022-03-31 PROCEDURE — 1101F PT FALLS ASSESS-DOCD LE1/YR: CPT | Performed by: FAMILY MEDICINE

## 2022-03-31 PROCEDURE — G8536 NO DOC ELDER MAL SCRN: HCPCS | Performed by: FAMILY MEDICINE

## 2022-03-31 PROCEDURE — 99212 OFFICE O/P EST SF 10 MIN: CPT | Performed by: FAMILY MEDICINE

## 2022-03-31 PROCEDURE — G8510 SCR DEP NEG, NO PLAN REQD: HCPCS | Performed by: FAMILY MEDICINE

## 2022-03-31 PROCEDURE — G8419 CALC BMI OUT NRM PARAM NOF/U: HCPCS | Performed by: FAMILY MEDICINE

## 2022-03-31 PROCEDURE — G8427 DOCREV CUR MEDS BY ELIG CLIN: HCPCS | Performed by: FAMILY MEDICINE

## 2022-03-31 PROCEDURE — 3017F COLORECTAL CA SCREEN DOC REV: CPT | Performed by: FAMILY MEDICINE

## 2022-03-31 RX ORDER — PILOCARPINE HYDROCHLORIDE 40 MG/ML
SOLUTION/ DROPS OPHTHALMIC
COMMUNITY
Start: 2022-03-11

## 2022-03-31 NOTE — PROGRESS NOTES
1. \"Have you been to the ER, urgent care clinic since your last visit? Hospitalized since your last visit? \" No    2. \"Have you seen or consulted any other health care providers outside of the [address] [place] [address] since your last visit? \" No     3. For patients aged 39-70: Has the patient had a colonoscopy / FIT/ Cologuard? No      If the patient is female:    4. For patients aged 41-77: Has the patient had a mammogram within the past 2 years? NA - based on age or sex      11. For patients aged 21-65: Has the patient had a pap smear?  NA - based on age or sex

## 2022-03-31 NOTE — PROGRESS NOTES
Chief Complaint   Patient presents with    Pre-op Exam     pre op exam for mechanical vitrectomy with repositioning of IOL prosthesis to be performed on 2022 by Dr. Nelli Mcguire at [facility] in [facility] under [facility] anesthesia     Preoperative Evaluation    Date of Exam: 3/31/2022    Carissa Sage is a 70 y.o. male (:1950) who presents for preoperative evaluation. No complaints. Has seen cardiology for clearance prior to knee arthroscopy. Latex Allergy: no    Medical History:     Past Medical History:   Diagnosis Date    Agatston coronary artery calcium score less than 100 2/10/2014    Coronary calcium score of 82.  Arthritis     right shoulder, SC joint, knees    Bilateral knee pain     BPH (benign prostatic hyperplasia)     has tried avodart and flomax unsuccessfully in the .  ED (erectile dysfunction)     Giant cell granuloma     oral, lower inner gingiva    H/O colonoscopy 3/2005 / --2020    6mm polyp every 5 years based on pathology report    Hearing disorder, sensorineural 2014    Using aides    History of colonoscopy with polypectomy 2020    Dr. Abdulaziz Terrell; internal hemorrhoids, mild diverticulosis,polyp(adenoma); f/u colonoscopy in 5 years    Hypercholesteremia     Neutropenia ([facility] [address] 2016    Sees heme/onc    Onychomycosis     toenails    Pseudoexfoliation (PXF) glaucoma of both eyes      Allergies: Allergies   Allergen Reactions    Benzocaine Swelling    Codeine Nausea Only      Medications:     Current Outpatient Medications   Medication Sig    pilocarpine (PILOCAR) 4 % ophthalmic solution INSTILL 1 DROP INTO RIGHT EYE THREE TIMES DAILY    ondansetron hcl (Zofran) 4 mg tablet Take 1 Tablet by mouth every eight (8) hours as needed for Nausea or Vomiting.  tadalafiL (Cialis) 5 mg tablet Take 1 Tablet by mouth daily.  brimonidine-timoloL (COMBIGAN) 0.2-0.5 % drop ophthalmic solution 1 Drop every twelve (12) hours.     latanoprost (XALATAN) 0.005 % ophthalmic solution INSTILL 1 DROP INTO BOTH EYES AT BEDTIME    ascorbic acid (VITAMIN C PO) Take 1 Tablet by mouth daily.  CALCIUM ACETATE PO Take 1 Tablet by mouth daily.  cetirizine (ZYRTEC) 10 mg tablet Take 10 mg by mouth daily as needed.  ibuprofen (MOTRIN IB) 200 mg tablet Take 400 mg by mouth as needed for Pain.  KRILL OIL PO Take 1 capsule by mouth daily.  GLUCOSAMINE/CHONDROITIN SULF A (GLUCOSAMINE-CHONDROITIN PO) Take 1,500 mg by mouth daily.  BENEFIBER, GUAR GUM, PO Take  by mouth. 1 tbsp daily    multivitamin (ONE A DAY) tablet Take 1 Tab by mouth daily.  Cholecalciferol, Vitamin D3, (VITAMIN D3) 1,000 unit cap Take 1 Tablet by mouth daily.  ketorolac (ACULAR) 0.5 % ophthalmic solution INSTILL 1 DROP INTO BOTH EYES FOUR TIMES A DAY (Patient not taking: Reported on 3/31/2022)     No current facility-administered medications for this visit. Surgical History:     Past Surgical History:   Procedure Laterality Date    HX CATARACT REMOVAL      HX KNEE ARTHROSCOPY Right 01/06/2022    meniscus    HX ORTHOPAEDIC Right 1980    plantar fasciitis, neurofibroma?  HX OTHER SURGICAL  2008    peripheral giant cell granuloma removal     HX OTHER SURGICAL Right 02/15/2021    eyesurgery for dislocated intra-ocular lens    HX TURP  2009    for BPH     HX VASECTOMY  1984     Social History:     Social History     Socioeconomic History    Marital status:    Occupational History    Occupation: retired      Employer: RETIRED   Tobacco Use    Smoking status: Never Smoker    Smokeless tobacco: Never Used   Vaping Use    Vaping Use: Never used   Substance and Sexual Activity    Alcohol use:  Yes     Alcohol/week: 1.7 standard drinks     Types: 2 Standard drinks or equivalent per week    Drug use: No    Sexual activity: Yes     Partners: Female     Anesthesia Complications: None  History of abnormal bleeding : None    Objective:     ROS: Feeling well. No dyspnea or chest pain on exertion. No abdominal pain, change in bowel habits, black or bloody stools. No urinary tract or prostatic symptoms. No neurological complaints. OBJECTIVE:   The patient appears well, alert, oriented x 3, in no distress. Visit Vitals  BP (!) 146/80   Pulse 63   Temp 98.9 °F (37.2 °C) (Temporal)   Resp 14   Ht 5' 11\" (1.803 m)   Wt 184 lb (83.5 kg)   SpO2 98%   BMI 25.66 kg/m²     ENT normal.    Lungs are clear, good air entry, no wheezes, rhonchi or rales. Cardiovascular:S1 and S2 normal, no murmurs, regular rate and rhythm. Extremities show no edema  Neurological is normal without focal findings. IMPRESSION:     ICD-10-CM ICD-9-CM    1. Dislocation of intraocular lens, sequela  T85. 22XS 909.3    2. Pre-op exam  Z01.818 V72.84    3. Elevated blood pressure reading  R03.0 796.2    4. EKG abnormalities  R94.31 794.31    5. Preoperative clearance  Z01.818 V72.84      Dislocation of intraocular lens - likely to be cleared for surgery. Low risk and no contraindications. Elevated blood pressure - We will bring him in for a [place] BP check. If still elevated, start low dose Norvasc. EKG abnormalities - has seen cardiology. All chart history elements were reviewed by me at the time of the visit even though marked at time of note closure. Patient understands our medical plan. Patient has provided input and agrees with goals. Alternatives have been explained and offered. All questions answered. The patient is to call if condition worsens or fails to improve.      Yasemin Gale MD   3/31/2022

## 2022-03-31 NOTE — PATIENT INSTRUCTIONS
A Healthy Lifestyle: Care Instructions  Your Care Instructions     A healthy lifestyle can help you feel good, stay at a healthy weight, and have plenty of energy for both work and play. A healthy lifestyle is something you can share with your whole family. A healthy lifestyle also can lower your risk for serious health problems, such as high blood pressure, heart disease, and diabetes. You can follow a few steps listed below to improve your health and the health of your family. Follow-up care is a key part of your treatment and safety. Be sure to make and go to all appointments, and call your doctor if you are having problems. It's also a good idea to know your test results and keep a list of the medicines you take. How can you care for yourself at home? · Do not eat too much sugar, fat, or fast foods. You can still have dessert and treats now and then. The goal is moderation. · Start small to improve your eating habits. Pay attention to portion sizes, drink less juice and soda pop, and eat more fruits and vegetables. ? Eat a healthy amount of food. A 3-ounce serving of meat, for example, is about the size of a deck of cards. Fill the rest of your plate with vegetables and whole grains. ? Limit the amount of soda and sports drinks you have every day. Drink more water when you are thirsty. ? Eat plenty of fruits and vegetables every day. Have an apple or some carrot sticks as an afternoon snack instead of a candy bar. Try to have fruits and/or vegetables at every meal.  · Make exercise part of your daily routine. You may want to start with simple activities, such as walking, bicycling, or slow swimming. Try to be active 30 to 60 minutes every day. You do not need to do all 30 to 60 minutes all at once. For example, you can exercise 3 times a day for 10 or 20 minutes.  Moderate exercise is safe for most people, but it is always a good idea to talk to your doctor before starting an exercise program.  · Keep moving. [employer] the lawn, work in the garden, or [employer]. Take the stairs instead of the elevator at work. · If you smoke, quit. People who smoke have an increased risk for heart attack, stroke, cancer, and other lung illnesses. Quitting is hard, but there are ways to boost your chance of quitting tobacco for good. ? Use nicotine gum, patches, or lozenges. ? Ask your doctor about stop-smoking programs and medicines. ? Keep trying. In addition to reducing your risk of diseases in the future, you will notice some benefits soon after you stop using tobacco. If you have shortness of breath or asthma symptoms, they will likely get better within a few weeks after you quit. · Limit how much alcohol you drink. Moderate amounts of alcohol (up to 2 drinks a day for men, 1 drink a day for women) are okay. But drinking too much can lead to liver problems, high blood pressure, and other health problems. Family health  If you have a family, there are many things you can do together to improve your health. · Eat meals together as a family as often as possible. · Eat healthy foods. This includes fruits, vegetables, lean meats and dairy, and whole grains. · Include your family in your fitness plan. Most people think of activities such as jogging or tennis as the way to fitness, but there are many ways you and your family can be more active. Anything that makes you breathe hard and gets your heart pumping is exercise. Here are some tips:  ? Walk to do errands or to take your child to school or the bus.  ? Go for a family bike ride after dinner instead of watching TV. Where can you learn more? Go to http://www.gray.com/  Enter B764 in the search box to learn more about \"A Healthy Lifestyle: Care Instructions. \"  Current as of: June 16, 2021               Content Version: 13.2  © 6024-1281 Healthwise, Incorporated.    Care instructions adapted under license by [facility] (which disclaims liability or warranty for this information). If you have questions about a medical condition or this instruction, always ask your healthcare professional. [address] any warranty or liability for your use of this information. DASH Diet: Care Instructions  Your Care Instructions     The DASH diet is an eating plan that can help lower your blood pressure. DASH stands for Dietary Approaches to Stop Hypertension. Hypertension is high blood pressure. The DASH diet focuses on eating foods that are high in calcium, potassium, and magnesium. These nutrients can lower blood pressure. The foods that are highest in these nutrients are fruits, vegetables, low-fat dairy products, nuts, seeds, and legumes. But taking calcium, potassium, and magnesium supplements instead of eating foods that are high in those nutrients does not have the same effect. The DASH diet also includes whole grains, fish, and poultry. The DASH diet is one of several lifestyle changes your doctor may recommend to lower your high blood pressure. Your doctor may also want you to decrease the amount of sodium in your diet. Lowering sodium while following the DASH diet can lower blood pressure even further than just the DASH diet alone. Follow-up care is a key part of your treatment and safety. Be sure to make and go to all appointments, and call your doctor if you are having problems. It's also a good idea to know your test results and keep a list of the medicines you take. How can you care for yourself at home? Following the DASH diet  · Eat 4 to 5 servings of fruit each day. A serving is 1 medium-sized piece of fruit, ½ cup chopped or canned fruit, 1/4 cup dried fruit, or 4 ounces (½ cup) of fruit juice. Choose fruit more often than fruit juice. · Eat 4 to 5 servings of vegetables each day.  A serving is 1 cup of lettuce or raw leafy vegetables, ½ cup of chopped or cooked vegetables, or 4 ounces (½ cup) of vegetable juice. Choose vegetables more often than vegetable juice. · Get 2 to 3 servings of low-fat and fat-free dairy each day. A serving is 8 ounces of milk, 1 cup of yogurt, or 1 ½ ounces of cheese. · Eat 6 to 8 servings of grains each day. A serving is 1 slice of bread, 1 ounce of dry cereal, or ½ cup of cooked rice, pasta, or cooked cereal. Try to choose whole-grain products as much as possible. · Limit lean meat, poultry, and fish to 2 servings each day. A serving is 3 ounces, about the size of a deck of cards. · Eat 4 to 5 servings of nuts, seeds, and legumes (cooked dried beans, lentils, and split peas) each week. A serving is 1/3 cup of nuts, 2 tablespoons of seeds, or ½ cup of cooked beans or peas. · Limit fats and oils to 2 to 3 servings each day. A serving is 1 teaspoon of vegetable oil or 2 tablespoons of salad dressing. · Limit sweets and added sugars to 5 servings or less a week. A serving is 1 tablespoon jelly or jam, ½ cup sorbet, or 1 cup of lemonade. · Eat less than 2,300 milligrams (mg) of sodium a day. If you limit your sodium to 1,500 mg a day, you can lower your blood pressure even more. · Be aware that all of these are the suggested number of servings for people who eat 1,800 to 2,000 calories a day. Your recommended number of servings may be different if you need more or fewer calories. Tips for success  · Start small. Do not try to make dramatic changes to your diet all at once. You might feel that you are missing out on your favorite foods and then be more likely to not follow the plan. Make small changes, and stick with them. Once those changes become habit, add a few more changes. · Try some of the following:  ? Make it a goal to eat a fruit or vegetable at every meal and at snacks. This will make it easy to get the recommended amount of fruits and vegetables each day. ? Try yogurt topped with fruit and nuts for a snack or healthy dessert.   ? Add lettuce, tomato, cucumber, and onion to sandwiches. ? Combine a ready-made pizza crust with low-fat mozzarella cheese and lots of vegetable toppings. Try using tomatoes, squash, spinach, broccoli, carrots, cauliflower, and onions. ? Have a variety of cut-up vegetables with a low-fat dip as an appetizer instead of chips and dip. ? Sprinkle sunflower seeds or chopped almonds over salads. Or try adding chopped walnuts or almonds to cooked vegetables. ? Try some vegetarian meals using beans and peas. Add garbanzo or kidney beans to salads. Make burritos and tacos with mashed egan beans or black beans. Where can you learn more? Go to http://www.collier.com/  Enter H967 in the search box to learn more about \"DASH Diet: Care Instructions. \"  Current as of: January 10, 2022               Content Version: 13.2  © 2006-2022 Healthwise, [facility]. Care instructions adapted under license by [employer] (which disclaims liability or warranty for this information). If you have questions about a medical condition or this instruction, always ask your healthcare professional. [place] [address] any warranty or liability for your use of this information.

## 2022-04-07 ENCOUNTER — OFFICE VISIT (OUTPATIENT)
Dept: ORTHOPEDIC SURGERY | Age: 72
End: 2022-04-07
Payer: MEDICARE

## 2022-04-07 VITALS
HEART RATE: 53 BPM | HEIGHT: 71 IN | RESPIRATION RATE: 18 BRPM | OXYGEN SATURATION: 96 % | TEMPERATURE: 96.8 F | BODY MASS INDEX: 25.26 KG/M2 | WEIGHT: 180.4 LBS

## 2022-04-07 DIAGNOSIS — S83.281A TEAR OF LATERAL MENISCUS OF RIGHT KNEE, CURRENT, UNSPECIFIED TEAR TYPE, INITIAL ENCOUNTER: Primary | ICD-10-CM

## 2022-04-07 DIAGNOSIS — M17.11 PRIMARY OSTEOARTHRITIS OF RIGHT KNEE: ICD-10-CM

## 2022-04-07 PROCEDURE — 99024 POSTOP FOLLOW-UP VISIT: CPT | Performed by: ORTHOPAEDIC SURGERY

## 2022-04-07 NOTE — PROGRESS NOTES
Patient: Gaye Narrow  YOB: 1950       HISTORY:  The patient presents for reevaluation of his left knee status post arthroscopic partial lateral menisectomy with degenerative arthritis with joint space narrowing on 1/6/22. Patient is improved, states pain is a 0 out of 10.  he has not gone to physical therapy but has been continuing with home exercises. He went to [place] in early March and took a fall due to the knee giving out. Has pain with prolonged sitting. Notes stiffness but the pain has improved. Patient denies any fever, chills, chest pain, shortness of breath or calf pain. The remainder of the review of systems is negative. There are no new illness or injuries to report since last seen in the office. No changes in medications, allergies, social or family history. PHYSICAL EXAMINATION:    Visit Vitals  Pulse (!) 53   Temp 96.8 °F (36 °C) (Temporal)   Resp 18   Ht 5' 11\" (1.803 m)   Wt 180 lb 6.4 oz (81.8 kg)   SpO2 96%   BMI 25.16 kg/m²     The patient is a well-developed, well-nourished male in no acute distress. The patient is alert and oriented times three. The patient appears to be well groomed. Mood and affect are normal.   ORTHOPEDIC EXAM of Left knee: Inspection: Effusion not present,  incisions clean, dry intact, sutures in place  TTP: lateral joint line  Range of motion: 0-100 flexion  Stability: Stable  Strength: 5/5  2+ distal pulses    IMPRESSION:  Status post Left knee arthroscopic partial lateral menisectomy. PLAN: Pt is having residual pain following a trip to [place] due to exacerbation of degenerative arthritis with joint space narrowing. Discussed proceeding with [facility] but pt is planning a trip to [place]. Stressed to patient that nothing causes an increase in pain or swelling.      RTC when he returns from [place] for [facility]aid     Scribed by Sonido Zamudio[address]) as dictated by Priscilla Garcia MD    I, Dr. Priscilla Garcia, confirm that all documentation is accurate.     Cony Garcia M.D.   [facility] [address] [facility] Specialist

## 2022-04-08 ENCOUNTER — CLINICAL SUPPORT (OUTPATIENT)
Dept: FAMILY MEDICINE CLINIC | Age: 72
End: 2022-04-08

## 2022-04-08 VITALS — OXYGEN SATURATION: 99 % | DIASTOLIC BLOOD PRESSURE: 70 MMHG | SYSTOLIC BLOOD PRESSURE: 128 MMHG | HEART RATE: 51 BPM

## 2022-04-08 DIAGNOSIS — R03.0 ELEVATED BLOOD PRESSURE READING: Primary | ICD-10-CM

## 2022-04-08 NOTE — PATIENT INSTRUCTIONS
A Healthy Lifestyle: Care Instructions  Your Care Instructions     A healthy lifestyle can help you feel good, stay at a healthy weight, and have plenty of energy for both work and play. A healthy lifestyle is something you can share with your whole family. A healthy lifestyle also can lower your risk for serious health problems, such as high blood pressure, heart disease, and diabetes. You can follow a few steps listed below to improve your health and the health of your family. Follow-up care is a key part of your treatment and safety. Be sure to make and go to all appointments, and call your doctor if you are having problems. It's also a good idea to know your test results and keep a list of the medicines you take. How can you care for yourself at home? · Do not eat too much sugar, fat, or fast foods. You can still have dessert and treats now and then. The goal is moderation. · Start small to improve your eating habits. Pay attention to portion sizes, drink less juice and soda pop, and eat more fruits and vegetables. ? Eat a healthy amount of food. A 3-ounce serving of meat, for example, is about the size of a deck of cards. Fill the rest of your plate with vegetables and whole grains. ? Limit the amount of soda and sports drinks you have every day. Drink more water when you are thirsty. ? Eat plenty of fruits and vegetables every day. Have an apple or some carrot sticks as an afternoon snack instead of a candy bar. Try to have fruits and/or vegetables at every meal.  · Make exercise part of your daily routine. You may want to start with simple activities, such as walking, bicycling, or slow swimming. Try to be active 30 to 60 minutes every day. You do not need to do all 30 to 60 minutes all at once. For example, you can exercise 3 times a day for 10 or 20 minutes.  Moderate exercise is safe for most people, but it is always a good idea to talk to your doctor before starting an exercise program.  · Keep moving. Santino Anderson[employer] the lawn, work in the garden, or [employer]. Take the stairs instead of the elevator at work. · If you smoke, quit. People who smoke have an increased risk for heart attack, stroke, cancer, and other lung illnesses. Quitting is hard, but there are ways to boost your chance of quitting tobacco for good. ? Use nicotine gum, patches, or lozenges. ? Ask your doctor about stop-smoking programs and medicines. ? Keep trying. In addition to reducing your risk of diseases in the future, you will notice some benefits soon after you stop using tobacco. If you have shortness of breath or asthma symptoms, they will likely get better within a few weeks after you quit. · Limit how much alcohol you drink. Moderate amounts of alcohol (up to 2 drinks a day for men, 1 drink a day for women) are okay. But drinking too much can lead to liver problems, high blood pressure, and other health problems. Family health  If you have a family, there are many things you can do together to improve your health. · Eat meals together as a family as often as possible. · Eat healthy foods. This includes fruits, vegetables, lean meats and dairy, and whole grains. · Include your family in your fitness plan. Most people think of activities such as jogging or tennis as the way to fitness, but there are many ways you and your family can be more active. Anything that makes you breathe hard and gets your heart pumping is exercise. Here are some tips:  ? Walk to do errands or to take your child to school or the bus.  ? Go for a family bike ride after dinner instead of watching TV. Where can you learn more? Go to http://maria eugenia-louis.info/  Enter K448 in the search box to learn more about \"A Healthy Lifestyle: Care Instructions. \"  Current as of: June 16, 2021               Content Version: 13.2  © 2513-1800 Healthwise, Incorporated.    Care instructions adapted under license by [facility] (which disclaims liability or warranty for this information). If you have questions about a medical condition or this instruction, always ask your healthcare professional. [address] any warranty or liability for your use of this information.

## 2022-04-08 NOTE — PROGRESS NOTES
Chief Complaint   Patient presents with    Blood Pressure Check       Patient presents for blood pressure check per Dr. Shemar Kelly. Patient advised to follow-up as scheduled. Patient verbalized understanding of plan and agree. Pre op form, office note and most recent labs faxed to [facility] @ [phone].

## 2022-05-05 ENCOUNTER — OFFICE VISIT (OUTPATIENT)
Dept: ORTHOPEDIC SURGERY | Age: 72
End: 2022-05-05
Payer: MEDICARE

## 2022-05-05 VITALS — WEIGHT: 175 LBS | BODY MASS INDEX: 24.5 KG/M2 | HEIGHT: 71 IN

## 2022-05-05 DIAGNOSIS — M17.11 PRIMARY OSTEOARTHRITIS OF RIGHT KNEE: Primary | ICD-10-CM

## 2022-05-05 PROCEDURE — 20611 DRAIN/INJ JOINT/BURSA W/US: CPT | Performed by: ORTHOPAEDIC SURGERY

## 2022-05-05 NOTE — PROGRESS NOTES
Patient: Asuncion De Los Santos                MRN: [id number]       SSN: [id number]  YOB: 1950        AGE: 67 y.o. SEX: male  Body mass index is 24.41 kg/m². PCP: Navin Owens MD  05/05/22    Chief Complaint   Patient presents with    Knee Pain     Right       HISTORY:  Asuncion De Los Santos is a 67 y.o. male who is seen for reevaluation of Right knee and here for 1st injection of Euflexxa. PROCEDURE:  Under ultrasound guidance, patient's Right knee, after timeout under sterile conditions, was injected with 2 cc of Euflexxa. Intra-articular. Ultrasound images captured using [employer] Loop Ultrasound machine using a frequency of 10 MHz with a linear transducer and scanned into patient's chart. [facility] [place]  OFFICE PROCEDURE PROGRESS NOTE        Chart reviewed for the following:   Earlene Arguelles MD, have reviewed the History, Physical and updated the Allergic reactions for [place] performed immediately prior to start of procedure:   Earlene Arguelles MD, have performed the following reviews on Asuncion De Los Santos prior to the start of the procedure:            * Patient was identified by name and date of birth   * Agreement on procedure being performed was verified  * Risks and Benefits explained to the patient  * Procedure site verified and marked as necessary  * Patient was positioned for comfort  * Needle placement confirmed by ultrasound  * Consent was signed and verified     Time: 8:54 AM       Date of procedure: 5/5/2022    Procedure performed by:  Fior Staples MD    Provider assisted by: None     How tolerated by patient: tolerated the procedure well with no complications    Comments: none    IMPRESSION:     ICD-10-CM ICD-9-CM    1. Primary osteoarthritis of right knee  M17.11 715.16         PLAN:  Mr. Meredith Schaefer will return in one week for his second Euflexxa injection.       Scribed by Feliz Mayer ([facility]) as dictated by MD IMAN Zarate, Dr. Fior Staples, confirm that all documentation is accurate.     Fior Staples M.D.   [facility] and Spine Specialist

## 2022-05-12 ENCOUNTER — OFFICE VISIT (OUTPATIENT)
Dept: ORTHOPEDIC SURGERY | Age: 72
End: 2022-05-12
Payer: MEDICARE

## 2022-05-12 VITALS — TEMPERATURE: 98 F | WEIGHT: 177 LBS | BODY MASS INDEX: 24.69 KG/M2

## 2022-05-12 DIAGNOSIS — M17.11 PRIMARY OSTEOARTHRITIS OF RIGHT KNEE: Primary | ICD-10-CM

## 2022-05-12 PROCEDURE — 20611 DRAIN/INJ JOINT/BURSA W/US: CPT | Performed by: ORTHOPAEDIC SURGERY

## 2022-05-12 RX ORDER — TRIAMCINOLONE ACETONIDE 40 MG/ML
40 INJECTION, SUSPENSION INTRA-ARTICULAR; INTRAMUSCULAR ONCE
Status: DISCONTINUED | OUTPATIENT
Start: 2022-05-12 | End: 2022-05-12

## 2022-05-12 NOTE — PROGRESS NOTES
Patient: Gaye Samson                MRN: [id number]       SSN: [id number]  YOB: 1950        AGE: 67 y.o. SEX: male  Body mass index is 24.69 kg/m². PCP: Rey Luque MD  05/12/22    Chief Complaint   Patient presents with    Knee Pain     right       HISTORY:  Gaye Samson is a 67 y.o. male who is seen for reevaluation of Right knee and here for 2nd injection of Euflexxa. PROCEDURE:  Under ultrasound guidance, patient's Right knee, after timeout under sterile conditions, was injected with 2 cc of Euflexxa. Intra-articular. Ultrasound images captured using [employer] Loop Ultrasound machine using a frequency of 10 MHz with a linear transducer and scanned into patient's chart. [facility] - [facility]  OFFICE PROCEDURE PROGRESS NOTE        Chart reviewed for the following:   Rebekah Cristina MD, have reviewed the History, Physical and updated the Allergic reactions for [place]leen[place] performed immediately prior to start of procedure:   Rebekah Cristina MD, have performed the following reviews on Memory Narrow prior to the start of the procedure:            * Patient was identified by name and date of birth   * Agreement on procedure being performed was verified  * Risks and Benefits explained to the patient  * Procedure site verified and marked as necessary  * Patient was positioned for comfort  * Needle placement confirmed by ultrasound  * Consent was signed and verified     Time: 7:59 AM    Date of procedure: 5/12/2022    Procedure performed by:  Priscilla Garcia MD    Provider assisted by: None     How tolerated by patient: tolerated the procedure well with no complications    Comments: none    IMPRESSION:     ICD-10-CM ICD-9-CM    1. Primary osteoarthritis of right knee  M17.11 715.16         PLAN:  Mr. Amarjit Aguirre will return in one week for his third Euflexxa injection.       Scribed by Sonido Szymanski ([address]) as dictated by MD IMAN Welch, Dr. Georgia Walker, confirm that all documentation is accurate.     Georgia Walker M.D.   [facility] Specialist

## 2022-05-19 ENCOUNTER — OFFICE VISIT (OUTPATIENT)
Dept: ORTHOPEDIC SURGERY | Age: 72
End: 2022-05-19
Payer: MEDICARE

## 2022-05-19 VITALS — TEMPERATURE: 97.5 F | BODY MASS INDEX: 24.78 KG/M2 | WEIGHT: 177 LBS | HEIGHT: 71 IN

## 2022-05-19 DIAGNOSIS — M17.11 PRIMARY OSTEOARTHRITIS OF RIGHT KNEE: Primary | ICD-10-CM

## 2022-05-19 PROCEDURE — 20611 DRAIN/INJ JOINT/BURSA W/US: CPT | Performed by: ORTHOPAEDIC SURGERY

## 2022-05-19 NOTE — PROGRESS NOTES
Patient: Мария Reid                MRN: [id number]       SSN: [id number]  YOB: 1950        AGE: 67 y.o. SEX: male  Body mass index is 24.69 kg/m². PCP: Elli Valdez MD  05/19/22    Chief Complaint   Patient presents with    Injection     rt knee        HISTORY:  Мария Reid is a 67 y.o. male who is seen for reevaluation of Right knee and here for 3rd and final injection of Euflexxa. PROCEDURE:  Under ultrasound guidance, patient's Right knee, after timeout under sterile conditions, was injected with 2 cc of Euflexxa. Intra-articular. Ultrasound images captured using [employer] Loop Ultrasound machine using a frequency of 10 MHz with a linear transducer and scanned into patient's chart. [facility]  OFFICE PROCEDURE PROGRESS NOTE        Chart reviewed for the following:   Sofía Yap MD, have reviewed the History, Physical and updated the Allergic reactions for [place] performed immediately prior to start of procedure:   Sofía Yap MD, have performed the following reviews on Мария Reid prior to the start of the procedure:            * Patient was identified by name and date of birth   * Agreement on procedure being performed was verified  * Risks and Benefits explained to the patient  * Procedure site verified and marked as necessary  * Patient was positioned for comfort  * Needle placement confirmed by ultrasound  * Consent was signed and verified     Time: 7:56 AM       Date of procedure: 5/19/2022    Procedure performed by:  Nathan Turner MD    Provider assisted by: None     How tolerated by patient: tolerated the procedure well with no complications    Comments: none    IMPRESSION:     ICD-10-CM ICD-9-CM    1. Primary osteoarthritis of right knee  M17.11 715.16         PLAN: Mr. Gurdeep Tee has completed his Euflexxa injection series. he will return as needed.       Scribed by Sangeeta Bailey ([facility]) as dictated by August Hines, MD VALERIO, Dr. Koch Overall, confirm that all documentation is accurate.     August Hines, M.D.   [facility] Specialist

## 2022-06-24 NOTE — PROGRESS NOTES
1. \"Have you been to the ER, urgent care clinic since your last visit? Hospitalized since your last visit? \" No    2. \"Have you seen or consulted any other health care providers outside of the [address] [place] [address] since your last visit? \" Yes Where: Dr. Joey Burk (ophthalmology) in [facility]    3. For patients aged 39-70: Has the patient had a colonoscopy / FIT/ Cologuard? Yes - no Care Gap present-due 9/2025       If the patient is female:    4. For patients aged 41-77: Has the patient had a mammogram within the past 2 years? NA - based on age or sex      11. For patients aged 21-65: Has the patient had a pap smear?  NA - based on age or sex

## 2022-06-27 ENCOUNTER — OFFICE VISIT (OUTPATIENT)
Dept: FAMILY MEDICINE CLINIC | Age: 72
End: 2022-06-27

## 2022-06-27 VITALS
DIASTOLIC BLOOD PRESSURE: 78 MMHG | TEMPERATURE: 98 F | RESPIRATION RATE: 14 BRPM | BODY MASS INDEX: 24.36 KG/M2 | HEIGHT: 71 IN | WEIGHT: 174 LBS | HEART RATE: 50 BPM | SYSTOLIC BLOOD PRESSURE: 122 MMHG | OXYGEN SATURATION: 96 %

## 2022-06-27 VITALS
RESPIRATION RATE: 14 BRPM | HEIGHT: 71 IN | OXYGEN SATURATION: 96 % | TEMPERATURE: 98 F | DIASTOLIC BLOOD PRESSURE: 78 MMHG | SYSTOLIC BLOOD PRESSURE: 122 MMHG | HEART RATE: 50 BPM | WEIGHT: 174 LBS | BODY MASS INDEX: 24.36 KG/M2

## 2022-06-27 DIAGNOSIS — N52.9 ERECTILE DYSFUNCTION, UNSPECIFIED ERECTILE DYSFUNCTION TYPE: ICD-10-CM

## 2022-06-27 DIAGNOSIS — Z12.5 PROSTATE CANCER SCREENING: ICD-10-CM

## 2022-06-27 DIAGNOSIS — Z13.220 SCREENING CHOLESTEROL LEVEL: ICD-10-CM

## 2022-06-27 DIAGNOSIS — Z00.00 MEDICARE ANNUAL WELLNESS VISIT, SUBSEQUENT: Primary | ICD-10-CM

## 2022-06-27 DIAGNOSIS — Z71.89 ADVANCED DIRECTIVES, COUNSELING/DISCUSSION: ICD-10-CM

## 2022-06-27 PROCEDURE — G8427 DOCREV CUR MEDS BY ELIG CLIN: HCPCS | Performed by: FAMILY MEDICINE

## 2022-06-27 PROCEDURE — G0439 PPPS, SUBSEQ VISIT: HCPCS | Performed by: FAMILY MEDICINE

## 2022-06-27 PROCEDURE — G8420 CALC BMI NORM PARAMETERS: HCPCS | Performed by: FAMILY MEDICINE

## 2022-06-27 PROCEDURE — G8510 SCR DEP NEG, NO PLAN REQD: HCPCS | Performed by: FAMILY MEDICINE

## 2022-06-27 PROCEDURE — 1123F ACP DISCUSS/DSCN MKR DOCD: CPT | Performed by: FAMILY MEDICINE

## 2022-06-27 PROCEDURE — G8536 NO DOC ELDER MAL SCRN: HCPCS | Performed by: FAMILY MEDICINE

## 2022-06-27 PROCEDURE — 1101F PT FALLS ASSESS-DOCD LE1/YR: CPT | Performed by: FAMILY MEDICINE

## 2022-06-27 PROCEDURE — 99497 ADVNCD CARE PLAN 30 MIN: CPT | Performed by: FAMILY MEDICINE

## 2022-06-27 PROCEDURE — 3017F COLORECTAL CA SCREEN DOC REV: CPT | Performed by: FAMILY MEDICINE

## 2022-06-27 NOTE — ACP (ADVANCE CARE PLANNING)
Advance Care Planning    Advance Care Planning (ACP) Provider Note - Comprehensive     Date of ACP Conversation: 06/27/22  Persons included in Conversation:  patient  Length of ACP Conversation in minutes:  16 minutes    Authorized Decision Maker (if patient is incapable of making informed decisions): This person is:  Healthcare Agent/Medical Power of  under Advance Directive  John Rodriguez, wife  Ashutosh Irene, older daughter ()   Rebecca Player, youngest daughter (pediatric GI physician). General ACP for ALL Patients with Decision Making Capacity:   Importance of advance care planning, including choosing a healthcare agent to communicate patient's healthcare decisions if patient lost the ability to make decisions, such as after a sudden illness or accident  Understanding of the healthcare agent role was assessed and information provided    Review of Existing Advance Directive: On file, reviewed.     For Serious or Chronic Illness:  No known illness    Interventions Provided:  Reviewed existing Advance Directive   Recommended communicating the plan and making copies for the healthcare agent, personal physician, and others as appropriate (e.g., health system)  Recommended review of completed ACP document annually or upon change in health status      Advance Care Planning

## 2022-06-27 NOTE — PATIENT INSTRUCTIONS
Medicare Wellness Visit, Male    The best way to live healthy is to have a lifestyle where you eat a well-balanced diet, exercise regularly, limit alcohol use, and quit all forms of tobacco/nicotine, if applicable. Regular preventive services are another way to keep healthy. Preventive services (vaccines, screening tests, monitoring & exams) can help personalize your care plan, which helps you manage your own care. Screening tests can find health problems at the earliest stages, when they are easiest to treat. [facility] follows the current, evidence-based guidelines published by the [facility] ([facility]) when recommending preventive services for our patients. Because we follow these guidelines, sometimes recommendations change over time as research supports it. (For example, a prostate screening blood test is no longer routinely recommended for men with no symptoms). Of course, you and your doctor may decide to screen more often for some diseases, based on your risk and co-morbidities (chronic disease you are already diagnosed with). Preventive services for you include:  - Medicare offers their members a free annual wellness visit, which is time for you and your primary care provider to discuss and plan for your preventive service needs. Take advantage of this benefit every year!  -All adults over age 72 should receive the recommended pneumonia vaccines. Current USPSTF guidelines recommend a series of two vaccines for the best pneumonia protection.   -All adults should have a flu vaccine yearly and tetanus vaccine every 10 years.  -All adults age 48 and older should receive the shingles vaccines (series of two vaccines).        -All adults age 38-68 who are overweight should have a diabetes screening test once every three years.   -Other screening tests & preventive services for persons with diabetes include: an eye exam to screen for diabetic retinopathy, a kidney function test, a foot exam, and stricter control over your cholesterol.   -Cardiovascular screening for adults with routine risk involves an electrocardiogram (ECG) at intervals determined by the provider.   -Colorectal cancer screening should be done for adults age 54-65 with no increased risk factors for colorectal cancer. There are a number of acceptable methods of screening for this type of cancer. Each test has its own benefits and drawbacks. Discuss with your provider what is most appropriate for you during your annual wellness visit. The different tests include: colonoscopy (considered the best screening method), a fecal occult blood test, a fecal DNA test, and sigmoidoscopy.  -All adults born between [facility] should be screened once for Hepatitis C.  -An Abdominal Aortic Aneurysm (AAA) Screening is recommended for men age 73-68 who has ever smoked in their lifetime.      Here is a list of your current Health Maintenance items (your personalized list of preventive services) with a due date:  Health Maintenance Due   Topic Date Due    Cholesterol Test   06/24/2021

## 2022-06-27 NOTE — PATIENT INSTRUCTIONS
A Healthy Lifestyle: Care Instructions  Your Care Instructions     A healthy lifestyle can help you feel good, stay at a healthy weight, and have plenty of energy for both work and play. A healthy lifestyle is something you can share with your whole family. A healthy lifestyle also can lower your risk for serious health problems, such as high blood pressure, heart disease, and diabetes. You can follow a few steps listed below to improve your health and the health of your family. Follow-up care is a key part of your treatment and safety. Be sure to make and go to all appointments, and call your doctor if you are having problems. It's also a good idea to know your test results and keep a list of the medicines you take. How can you care for yourself at home? · Do not eat too much sugar, fat, or fast foods. You can still have dessert and treats now and then. The goal is moderation. · Start small to improve your eating habits. Pay attention to portion sizes, drink less juice and soda pop, and eat more fruits and vegetables. ? Eat a healthy amount of food. A 3-ounce serving of meat, for example, is about the size of a deck of cards. Fill the rest of your plate with vegetables and whole grains. ? Limit the amount of soda and sports drinks you have every day. Drink more water when you are thirsty. ? Eat plenty of fruits and vegetables every day. Have an apple or some carrot sticks as an afternoon snack instead of a candy bar. Try to have fruits and/or vegetables at every meal.  · Make exercise part of your daily routine. You may want to start with simple activities, such as walking, bicycling, or slow swimming. Try to be active 30 to 60 minutes every day. You do not need to do all 30 to 60 minutes all at once. For example, you can exercise 3 times a day for 10 or 20 minutes.  Moderate exercise is safe for most people, but it is always a good idea to talk to your doctor before starting an exercise program.  · Keep moving. Sloane Cisnerosis the lawn, work in the garden, or Senic. Take the stairs instead of the elevator at work. · If you smoke, quit. People who smoke have an increased risk for heart attack, stroke, cancer, and other lung illnesses. Quitting is hard, but there are ways to boost your chance of quitting tobacco for good. ? Use nicotine gum, patches, or lozenges. ? Ask your doctor about stop-smoking programs and medicines. ? Keep trying. In addition to reducing your risk of diseases in the future, you will notice some benefits soon after you stop using tobacco. If you have shortness of breath or asthma symptoms, they will likely get better within a few weeks after you quit. · Limit how much alcohol you drink. Moderate amounts of alcohol (up to 2 drinks a day for men, 1 drink a day for women) are okay. But drinking too much can lead to liver problems, high blood pressure, and other health problems. Family health  If you have a family, there are many things you can do together to improve your health. · Eat meals together as a family as often as possible. · Eat healthy foods. This includes fruits, vegetables, lean meats and dairy, and whole grains. · Include your family in your fitness plan. Most people think of activities such as jogging or tennis as the way to fitness, but there are many ways you and your family can be more active. Anything that makes you breathe hard and gets your heart pumping is exercise. Here are some tips:  ? Walk to do errands or to take your child to school or the bus.  ? Go for a family bike ride after dinner instead of watching TV. Where can you learn more? Go to http://www.gray.com/  Enter Y367 in the search box to learn more about \"A Healthy Lifestyle: Care Instructions. \"  Current as of: June 16, 2021               Content Version: 13.2  © 2391-1823 Healthwise, [employer].    Care instructions adapted under license by [employer] (which disclaims liability or warranty for this information). If you have questions about a medical condition or this instruction, always ask your healthcare professional. [address] any warranty or liability for your use of this information.

## 2022-06-27 NOTE — PROGRESS NOTES
Chief Complaint   Patient presents with   [place] Annual Wellness Visit     3 most recent PHQ Screens 6/27/2022   Little interest or pleasure in doing things Not at all   Feeling down, depressed, irritable, or hopeless Not at all   Total Score PHQ 2 0     Abuse Screening Questionnaire 6/27/2022   Do you ever feel afraid of your partner? N   Are you in a relationship with someone who physically or mentally threatens you? N   Is it safe for you to go home? Y     Fall Risk Assessment, last 12 mths 6/27/2022   Able to walk? Yes   Fall in past 12 months? 1   Do you feel unsteady? 0   Are you worried about falling 0   Is TUG test greater than 12 seconds? 0   Is the gait abnormal? 0   Number of falls in past 12 months 2   Fall with injury? 1       1. \"Have you been to the ER, urgent care clinic since your last visit? Hospitalized since your last visit? \" No    2. \"Have you seen or consulted any other health care providers outside of the [address] [place] [address] [place] since your last visit? \" Yes Where: Dr. Pablo Platt (ophthalmology) in [place]    3. For patients aged 39-70: Has the patient had a colonoscopy / FIT/ Cologuard? Yes - no Care Gap present-due 9/2025       If the patient is female:    4. For patients aged 41-77: Has the patient had a mammogram within the past 2 years? NA - based on age or sex      11. For patients aged 21-65: Has the patient had a pap smear?  NA - based on age or sex

## 2022-06-27 NOTE — PROGRESS NOTES
Chief Complaint   Patient presents with    Annual Wellness Visit     This is the Subsequent Medicare Annual Wellness Exam, performed 12 months or more after the Initial AWV or the last Subsequent AWV    I have reviewed the patient's medical history in detail and updated the computerized patient record. History     Patient Active Problem List   Diagnosis Code    BPH (benign prostatic hypertrophy) N40.0    Hearing disorder, sensorineural H90.5    Erectile dysfunction N52.9    Advanced care planning/counseling discussion Z71.89     Past Medical History:   Diagnosis Date    Agatston coronary artery calcium score less than 100 2/10/2014    Coronary calcium score of 82.  Arthritis     right shoulder, SC joint, knees    Bilateral knee pain     BPH (benign prostatic hyperplasia)     has tried avodart and flomax unsuccessfully in the 90s.  ED (erectile dysfunction)     Giant cell granuloma     oral, lower inner gingiva    H/O colonoscopy 3/2005 / 03- / 9/2020    6mm polyp every 5 years based on pathology report    Hearing disorder, sensorineural 1/28/2014    Using aides    History of colonoscopy with polypectomy 09/02/2020    Dr. Mason Williamson; internal hemorrhoids, mild diverticulosis,polyp(adenoma); f/u colonoscopy in 5 years    Hypercholesteremia     Neutropenia ([facility] [address] 2016    Sees heme/onc    Onychomycosis     toenails    Pseudoexfoliation (PXF) glaucoma of both eyes 2021      Past Surgical History:   Procedure Laterality Date    HX CATARACT REMOVAL      HX KNEE ARTHROSCOPY Right 01/06/2022    meniscus    HX ORTHOPAEDIC Right 1980    plantar fasciitis, neurofibroma?     HX OTHER SURGICAL  2008    peripheral giant cell granuloma removal     HX OTHER SURGICAL Right 02/15/2021    eyesurgery for dislocated intra-ocular lens    HX OTHER SURGICAL Left 04/18/2022    repositioning of the intra-ocular lens    HX TURP  2009    for BPH     HX VASECTOMY  1984     Current Outpatient Medications Medication Sig Dispense Refill    pilocarpine (PILOCAR) 4 % ophthalmic solution INSTILL 1 DROP INTO RIGHT EYE THREE TIMES DAILY      brimonidine-timoloL (COMBIGAN) 0.2-0.5 % drop ophthalmic solution Administer 1 Drop to left eye every twelve (12) hours.  latanoprost (XALATAN) 0.005 % ophthalmic solution INSTILL 1 DROP INTO BOTH EYES AT BEDTIME      ascorbic acid (VITAMIN C PO) Take 1 Tablet by mouth daily.  cetirizine (ZYRTEC) 10 mg tablet Take 10 mg by mouth daily as needed.  KRILL OIL PO Take 1 capsule by mouth daily.  BENEFIBER, GUAR GUM, PO Take  by mouth. 1 tbsp daily      multivitamin (ONE A DAY) tablet Take 1 Tab by mouth daily.  Cholecalciferol, Vitamin D3, (VITAMIN D3) 1,000 unit cap Take 1 Tablet by mouth daily. Allergies   Allergen Reactions    Benzocaine Swelling    Codeine Nausea Only       Family History   Problem Relation Age of Onset    Depression Mother     Alcohol abuse Mother     Other Mother         tobacco use    Breast Cancer Mother     OSTEOARTHRITIS Mother     Cancer Mother 80        skin/pancreatic     Alcohol abuse Brother     Other Brother         tobacco use    Other Father         tobacco use    Hypertension Father     OSTEOARTHRITIS Father     Lung Cancer Father      Social History     Tobacco Use    Smoking status: Never Smoker    Smokeless tobacco: Never Used   Substance Use Topics    Alcohol use: Yes     Alcohol/week: 1.7 standard drinks     Types: 2 Standard drinks or equivalent per week       Depression Risk Factor Screening:     3 most recent PHQ Screens 6/27/2022   Little interest or pleasure in doing things Not at all   Feeling down, depressed, irritable, or hopeless Not at all   Total Score PHQ 2 0       Alcohol Risk Factor Screening (MALE > 65):    Do you average more 1 drink per night or more than 7 drinks a week: No    In the past three months have you have had more than 4 drinks containing alcohol on one occasion: No      Functional Ability and Level of Safety:   Hearing: Hearing is good. Does wear hearing aid. Activities of Daily Living: The home contains: no safety equipment. Patient does total self care    Ambulation: with no difficulty     Fall Risk:  Fall Risk Assessment, last 12 mths 6/27/2022   Able to walk? Yes   Fall in past 12 months? 1   Do you feel unsteady? 0   Are you worried about falling 0   Is TUG test greater than 12 seconds? 0   Is the gait abnormal? 0   Number of falls in past 12 months 2   Fall with injury? 1       Abuse Screen:  Patient is not abused    Cognitive Screening   Has your family/caregiver stated any concerns about your memory: no    Patient Care Team   Patient Care Team:  Stephanie Tian MD as PCP - General (Family Medicine)  Stephanie Tian MD as PCP - [facility]  Nanci Sullivan MD (Dermatology Physician)  Davion Sunshine MD (Ophthalmology)  Nayla Pedro MD (Oral Surgery)  Mazin Fuller MD (Gastroenterology)  Lorie Alas DO (Hematology and Oncology)  Tim Potter MD (Orthopedic Surgery)  Sharon Evangelista MD (Cardiovascular Disease Physician)    Assessment/Plan   Education and counseling provided:  Are appropriate based on today's review and evaluation      ICD-10-CM ICD-9-CM    1. Medicare annual wellness visit, subsequent  Z00.00 V70.0    2. Advanced directives, counseling/discussion  Z71.89 V65.49 ADVANCE CARE PLANNING FIRST 30 MINS   3. Prostate cancer screening  Z12.5 V76.44 PSA SCREENING (SCREENING)   4. Screening cholesterol level  Z13.220 V77.91 LIPID PANEL   5. Erectile dysfunction, unspecified erectile dysfunction type  N52.9 607.84 REFERRAL TO UROLOGY     Age and sex specific counseling. Screening for depression and alcoholism. Advanced directives / end of life planning discussion. ACP on file. Care team updated. Medicare recommended screening and prevention test table is filled out, reviewed, and provided to patient.     He is keeping up with oncology annually. Cont per cardiology. Refer to urology for uncontrolled ED and intolerability of ED meds. Patient understands our medical plan. Patient has provided input and agrees with goals. All questions answered. RTC yearly and as needed.

## 2022-07-11 ENCOUNTER — OFFICE VISIT (OUTPATIENT)
Dept: CARDIOLOGY CLINIC | Age: 72
End: 2022-07-11
Payer: MEDICARE

## 2022-07-11 VITALS
HEART RATE: 61 BPM | RESPIRATION RATE: 16 BRPM | OXYGEN SATURATION: 98 % | SYSTOLIC BLOOD PRESSURE: 125 MMHG | BODY MASS INDEX: 24.44 KG/M2 | WEIGHT: 174.6 LBS | DIASTOLIC BLOOD PRESSURE: 76 MMHG | HEIGHT: 71 IN

## 2022-07-11 DIAGNOSIS — I10 ESSENTIAL HYPERTENSION WITH GOAL BLOOD PRESSURE LESS THAN 140/90: Primary | ICD-10-CM

## 2022-07-11 PROCEDURE — 3017F COLORECTAL CA SCREEN DOC REV: CPT | Performed by: INTERNAL MEDICINE

## 2022-07-11 PROCEDURE — 1123F ACP DISCUSS/DSCN MKR DOCD: CPT | Performed by: INTERNAL MEDICINE

## 2022-07-11 PROCEDURE — G8536 NO DOC ELDER MAL SCRN: HCPCS | Performed by: INTERNAL MEDICINE

## 2022-07-11 PROCEDURE — G8510 SCR DEP NEG, NO PLAN REQD: HCPCS | Performed by: INTERNAL MEDICINE

## 2022-07-11 PROCEDURE — 99213 OFFICE O/P EST LOW 20 MIN: CPT | Performed by: INTERNAL MEDICINE

## 2022-07-11 PROCEDURE — G8420 CALC BMI NORM PARAMETERS: HCPCS | Performed by: INTERNAL MEDICINE

## 2022-07-11 PROCEDURE — G8427 DOCREV CUR MEDS BY ELIG CLIN: HCPCS | Performed by: INTERNAL MEDICINE

## 2022-07-11 PROCEDURE — 1101F PT FALLS ASSESS-DOCD LE1/YR: CPT | Performed by: INTERNAL MEDICINE

## 2022-07-11 NOTE — PROGRESS NOTES
Ciarra Wilks presents today for   Chief Complaint   Patient presents with    Follow-up     6 month       Ciarra Wilks preferred language for health care discussion is english/other. Is someone accompanying this pt? no    Is the patient using any DME equipment during [address]? no    Depression Screening:  3 most recent PHQ Screens 7/11/2022   Little interest or pleasure in doing things Not at all   Feeling down, depressed, irritable, or hopeless Not at all   Total Score PHQ 2 0       Learning Assessment:  Learning Assessment 7/11/2022   PRIMARY LEARNER Patient   HIGHEST LEVEL OF EDUCATION - PRIMARY LEARNER  -   BARRIERS PRIMARY LEARNER -   CO-LEARNER CAREGIVER -   PRIMARY LANGUAGE ENGLISH   LEARNER PREFERENCE PRIMARY DEMONSTRATION     -     -     -   ANSWERED BY patient   RELATIONSHIP SELF       Abuse Screening:  Abuse Screening Questionnaire 7/11/2022   Do you ever feel afraid of your partner? N   Are you in a relationship with someone who physically or mentally threatens you? N   Is it safe for you to go home? Y       Fall Risk  Fall Risk Assessment, last 12 mths 7/11/2022   Able to walk? Yes   Fall in past 12 months? 0   Do you feel unsteady? 0   Are you worried about falling 0   Is TUG test greater than 12 seconds? -   Is the gait abnormal? -   Number of falls in past 12 months -   Fall with injury? -           Pt currently taking Anticoagulant therapy? no    Pt currently taking Antiplatelet therapy ? no      Coordination of Care:  1. Have you been to the ER, urgent care clinic since your last visit? Hospitalized since your last visit? no    2. Have you seen or consulted any other health care providers outside of the [address] [place] [address] since your last visit? Include any pap smears or colon screening.  no

## 2022-07-11 NOTE — LETTER
7/11/2022    Patient: Johnathon Fry   YOB: 1950   Date of Visit: 7/11/2022     Jake Slade MD  [address] [address]  [facility] [address]    Dear Jake Slade MD,      Thank you for referring Mr. Fernando Wadsworth to [address] [place] [address] for evaluation. My notes for this consultation are attached. If you have questions, please do not hesitate to call me. I look forward to following your patient along with you.       Sincerely,    Luh Strickland MD

## 2022-07-11 NOTE — PROGRESS NOTES
[facility]    Mr. Oren Joe is 67 y.o. male with a history of DJD, borderline hypertension    Patient is here today for appointment he denies any prior history of MI or CHF. Patient has undergone left knee surgery since last visit without any complication. Denies any symptoms to suggest angina or heart failure. No cardiac symptoms whatsoever. Denies PND or lower extremity swelling. Past Medical History:   Diagnosis Date    Agatston coronary artery calcium score less than 100 2/10/2014    Coronary calcium score of 82.  Arthritis     right shoulder, SC joint, knees    Bilateral knee pain     BPH (benign prostatic hyperplasia)     has tried avodart and flomax unsuccessfully in the 90s.  ED (erectile dysfunction)     Giant cell granuloma     oral, lower inner gingiva    H/O colonoscopy 3/2005 / 03- / 9/2020    6mm polyp every 5 years based on pathology report    Hearing disorder, sensorineural 1/28/2014    Using aides    History of colonoscopy with polypectomy 09/02/2020    Dr. Lee Bowels; internal hemorrhoids, mild diverticulosis,polyp(adenoma); f/u colonoscopy in 5 years    Hypercholesteremia     Neutropenia ([facility] [address] 2016    Sees heme/onc    Onychomycosis     toenails    Pseudoexfoliation (PXF) glaucoma of both eyes 2021       Review of Systems:  Cardiac symptoms as noted above in HPI. All others negative. Denies fatigue, malaise, skin rash, joint pain, blurring vision, photophobia, neck pain, hemoptysis, chronic cough, nausea, vomiting, hematuria, burning micturition, BRBPR, chronic headaches. Current Outpatient Medications   Medication Sig    pilocarpine (PILOCAR) 4 % ophthalmic solution INSTILL 1 DROP INTO RIGHT EYE THREE TIMES DAILY    brimonidine-timoloL (COMBIGAN) 0.2-0.5 % drop ophthalmic solution Administer 1 Drop to left eye every twelve (12) hours.     latanoprost (XALATAN) 0.005 % ophthalmic solution INSTILL 1 DROP INTO BOTH EYES AT BEDTIME    ascorbic acid (VITAMIN C PO) Take 1 Tablet by mouth daily.  cetirizine (ZYRTEC) 10 mg tablet Take 10 mg by mouth daily as needed.  KRILL OIL PO Take 1 capsule by mouth daily.  BENEFIBER, GUAR GUM, PO Take  by mouth. 1 tbsp daily    multivitamin (ONE A DAY) tablet Take 1 Tab by mouth daily.  Cholecalciferol, Vitamin D3, (VITAMIN D3) 1,000 unit cap Take 1 Tablet by mouth daily. No current facility-administered medications for this visit. Past Surgical History:   Procedure Laterality Date    HX CATARACT REMOVAL      HX KNEE ARTHROSCOPY Right 01/06/2022    meniscus    HX ORTHOPAEDIC Right 1980    plantar fasciitis, neurofibroma?  HX OTHER SURGICAL  2008    peripheral giant cell granuloma removal     HX OTHER SURGICAL Right 02/15/2021    eyesurgery for dislocated intra-ocular lens    HX OTHER SURGICAL Left 04/18/2022    repositioning of the intra-ocular lens    HX TURP  2009    for BPH     HX VASECTOMY  1984       Allergies and Sensitivities:  Allergies   Allergen Reactions    Benzocaine Swelling    Codeine Nausea Only       Family History:  Family History   Problem Relation Age of Onset    Depression Mother     Alcohol abuse Mother     Other Mother         tobacco use    Breast Cancer Mother     OSTEOARTHRITIS Mother     Cancer Mother 80        skin/pancreatic     Alcohol abuse Brother     Other Brother         tobacco use    Other Father         tobacco use    Hypertension Father     OSTEOARTHRITIS Father     Lung Cancer Father        Social History:  Social History     Tobacco Use    Smoking status: Never Smoker    Smokeless tobacco: Never Used   Vaping Use    Vaping Use: Never used   Substance Use Topics    Alcohol use: Yes     Alcohol/week: 1.7 standard drinks     Types: 2 Standard drinks or equivalent per week    Drug use: No     He  reports that he has never smoked.  He has never used smokeless tobacco.  He  reports current alcohol use of about 1.7 standard drinks of alcohol per week. Physical Exam:  BP Readings from Last 3 Encounters:   22 125/76   22 122/78   22 122/78         Pulse Readings from Last 3 Encounters:   22 61   22 (!) 50   22 (!) 50          Wt Readings from Last 3 Encounters:   22 79.2 kg (174 lb 9.6 oz)   22 78.9 kg (174 lb)   22 78.9 kg (174 lb)       Constitutional: Oriented to person, place, and time. HENT: Head: Normocephalic and atraumatic. Neck: No JVD present. Carotid bruit is not appreciated. Cardiovascular: Regular rhythm. No murmur, gallop or rubs appreciated  Lung: Breath sounds normal. No respiratory distress. No ronchi or rales appreciated  Abdominal: No tenderness. No rebound and no guarding. Musculoskeletal: There is no lower extremity edema. No cynosis    LABS:   @  Lab Results   Component Value Date/Time    WBC 4.4 (L) 2021 03:06 PM    HGB 13.3 2021 03:06 PM    HCT 39.3 2021 03:06 PM    PLATELET 896  03:06 PM    MCV 92.7 2021 03:06 PM     Lab Results   Component Value Date/Time    Sodium 142 2021 03:06 PM    Potassium 4.0 2021 03:06 PM    Chloride 110 2021 03:06 PM    CO2 31 2021 03:06 PM    Glucose 108 (H) 2021 03:06 PM    BUN 22 (H) 2021 03:06 PM    Creatinine 1.12 2021 03:06 PM     No flowsheet data found. Lab Results   Component Value Date/Time    ALT (SGPT) 23 2016 09:00 AM     No results found for: HBA1C, MKX3QMOD, PQC5VDQV, MWT7GDKC  No results found for: TSH, TSH2, TSH3, TSHP, TSHEXT, TSHEXT    EK2021: Sinus bradycardia at 47 bpm.  Left axis deviation. Right bundle branch block. STRESS TEST (EST, PHARM, NUC, ECHO etc)    22    ECHO ADULT COMPLETE 2022 3/28/2022  Interpretation Summary    Left Ventricle: Left ventricle size is normal. Normal wall thickness. Normal wall motion.  Normal left ventricular systolic function with a visually estimated EF of 55 - 60%.   Aortic Valve: Mild transvalvular regurgitation.   Right Atrium: Right atrium is dilated.   Aorta: Moderately dilated aortic root. Ao Root diameter is 4.0 cm. IMPRESSION & PLAN:  Mr. Larry Wen is 67 y.o. male    Borderline hypertension:  /76 mm hg. patient is following low-salt diet    Aortic regurgitation:  Echo with mild AI in 03/2022. Currently without any heart failure or fluid overload  Continue with clinical observation. We will repeat echocardiogram in 12-24 months or sooner depending on symptoms    Patient does not have any symptoms to suggest angina or heart failure. This plan was discussed with patient who is in agreement. Thank you for allowing me to participate in patient care. Please feel free to call me if you have any question or concern. Alex Franklin MD  Please note: This document has been produced using voice recognition software. Unrecognized errors in transcription may be present.

## 2022-10-27 LAB
CHOLEST SERPL-MCNC: 246 MG/DL (ref 100–199)
HDLC SERPL-MCNC: 67 MG/DL
IMP & REVIEW OF LAB RESULTS: NORMAL
LDLC SERPL CALC-MCNC: 166 MG/DL (ref 0–99)
PSA SERPL-MCNC: 0.7 NG/ML (ref 0–4)
TRIGL SERPL-MCNC: 75 MG/DL (ref 0–149)
VLDLC SERPL CALC-MCNC: 13 MG/DL (ref 5–40)

## 2023-05-03 PROBLEM — D72.819 LEUKOPENIA: Status: ACTIVE | Noted: 2017-10-05

## 2023-05-03 PROBLEM — H43.02 VITREOUS PROLAPSE, LEFT: Status: ACTIVE | Noted: 2022-03-19

## 2023-05-03 PROBLEM — H26.8 PSEUDOEXFOLIATION OF BOTH LENS CAPSULES: Status: ACTIVE | Noted: 2021-02-23

## 2023-05-03 PROBLEM — H43.01 VITREOUS PROLAPSE, RIGHT: Status: ACTIVE | Noted: 2021-02-01

## 2023-05-03 PROBLEM — T85.22XA DISLOCATED IOL (INTRAOCULAR LENS), POSTERIOR, LEFT: Status: ACTIVE | Noted: 2021-02-01

## 2023-06-25 SDOH — ECONOMIC STABILITY: FOOD INSECURITY: WITHIN THE PAST 12 MONTHS, YOU WORRIED THAT YOUR FOOD WOULD RUN OUT BEFORE YOU GOT MONEY TO BUY MORE.: NEVER TRUE

## 2023-06-25 SDOH — ECONOMIC STABILITY: HOUSING INSECURITY
IN THE LAST 12 MONTHS, WAS THERE A TIME WHEN YOU DID NOT HAVE A STEADY PLACE TO SLEEP OR SLEPT IN A SHELTER (INCLUDING NOW)?: NO

## 2023-06-25 SDOH — ECONOMIC STABILITY: FOOD INSECURITY: WITHIN THE PAST 12 MONTHS, THE FOOD YOU BOUGHT JUST DIDN'T LAST AND YOU DIDN'T HAVE MONEY TO GET MORE.: NEVER TRUE

## 2023-06-25 SDOH — ECONOMIC STABILITY: INCOME INSECURITY: HOW HARD IS IT FOR YOU TO PAY FOR THE VERY BASICS LIKE FOOD, HOUSING, MEDICAL CARE, AND HEATING?: NOT HARD AT ALL

## 2023-06-25 SDOH — HEALTH STABILITY: PHYSICAL HEALTH: ON AVERAGE, HOW MANY MINUTES DO YOU ENGAGE IN EXERCISE AT THIS LEVEL?: 50 MIN

## 2023-06-25 SDOH — HEALTH STABILITY: PHYSICAL HEALTH: ON AVERAGE, HOW MANY DAYS PER WEEK DO YOU ENGAGE IN MODERATE TO STRENUOUS EXERCISE (LIKE A BRISK WALK)?: 6 DAYS

## 2023-06-25 SDOH — ECONOMIC STABILITY: TRANSPORTATION INSECURITY
IN THE PAST 12 MONTHS, HAS LACK OF TRANSPORTATION KEPT YOU FROM MEETINGS, WORK, OR FROM GETTING THINGS NEEDED FOR DAILY LIVING?: NO

## 2023-06-25 ASSESSMENT — PATIENT HEALTH QUESTIONNAIRE - PHQ9
SUM OF ALL RESPONSES TO PHQ QUESTIONS 1-9: 0
SUM OF ALL RESPONSES TO PHQ9 QUESTIONS 1 & 2: 0
SUM OF ALL RESPONSES TO PHQ QUESTIONS 1-9: 0
1. LITTLE INTEREST OR PLEASURE IN DOING THINGS: 0
SUM OF ALL RESPONSES TO PHQ QUESTIONS 1-9: 0
2. FEELING DOWN, DEPRESSED OR HOPELESS: 0
SUM OF ALL RESPONSES TO PHQ QUESTIONS 1-9: 0

## 2023-06-25 ASSESSMENT — LIFESTYLE VARIABLES
HOW OFTEN DO YOU HAVE A DRINK CONTAINING ALCOHOL: 3
HOW MANY STANDARD DRINKS CONTAINING ALCOHOL DO YOU HAVE ON A TYPICAL DAY: 1 OR 2
HOW OFTEN DO YOU HAVE A DRINK CONTAINING ALCOHOL: 2-4 TIMES A MONTH
HOW MANY STANDARD DRINKS CONTAINING ALCOHOL DO YOU HAVE ON A TYPICAL DAY: 1
HOW OFTEN DO YOU HAVE SIX OR MORE DRINKS ON ONE OCCASION: 1

## 2023-06-27 ENCOUNTER — OFFICE VISIT (OUTPATIENT)
Age: 73
End: 2023-06-27
Payer: MEDICARE

## 2023-06-27 VITALS
SYSTOLIC BLOOD PRESSURE: 124 MMHG | HEIGHT: 71 IN | WEIGHT: 169 LBS | DIASTOLIC BLOOD PRESSURE: 70 MMHG | TEMPERATURE: 98.1 F | OXYGEN SATURATION: 97 % | HEART RATE: 53 BPM | RESPIRATION RATE: 16 BRPM | BODY MASS INDEX: 23.66 KG/M2

## 2023-06-27 DIAGNOSIS — R19.4 CHANGE IN BOWEL HABIT: ICD-10-CM

## 2023-06-27 DIAGNOSIS — M54.50 CHRONIC MIDLINE LOW BACK PAIN WITHOUT SCIATICA: ICD-10-CM

## 2023-06-27 DIAGNOSIS — Z71.89 ACP (ADVANCE CARE PLANNING): ICD-10-CM

## 2023-06-27 DIAGNOSIS — E78.00 HYPERCHOLESTEROLEMIA: ICD-10-CM

## 2023-06-27 DIAGNOSIS — G89.29 CHRONIC MIDLINE LOW BACK PAIN WITHOUT SCIATICA: ICD-10-CM

## 2023-06-27 DIAGNOSIS — H20.9 IRIDOCYCLITIS: ICD-10-CM

## 2023-06-27 DIAGNOSIS — N52.9 ERECTILE DYSFUNCTION, UNSPECIFIED ERECTILE DYSFUNCTION TYPE: ICD-10-CM

## 2023-06-27 DIAGNOSIS — Z12.5 ENCOUNTER FOR SCREENING FOR MALIGNANT NEOPLASM OF PROSTATE: ICD-10-CM

## 2023-06-27 DIAGNOSIS — Z00.00 MEDICARE ANNUAL WELLNESS VISIT, SUBSEQUENT: Primary | ICD-10-CM

## 2023-06-27 PROCEDURE — G0439 PPPS, SUBSEQ VISIT: HCPCS | Performed by: FAMILY MEDICINE

## 2023-06-27 PROCEDURE — 99497 ADVNCD CARE PLAN 30 MIN: CPT | Performed by: FAMILY MEDICINE

## 2023-06-27 PROCEDURE — 1123F ACP DISCUSS/DSCN MKR DOCD: CPT | Performed by: FAMILY MEDICINE

## 2023-06-27 ASSESSMENT — LIFESTYLE VARIABLES
HOW MANY STANDARD DRINKS CONTAINING ALCOHOL DO YOU HAVE ON A TYPICAL DAY: 1 OR 2
HOW OFTEN DO YOU HAVE A DRINK CONTAINING ALCOHOL: 2-4 TIMES A MONTH

## 2023-07-13 ENCOUNTER — HOSPITAL ENCOUNTER (OUTPATIENT)
Facility: HOSPITAL | Age: 73
Discharge: HOME | End: 2023-07-13
Payer: MEDICARE

## 2023-07-13 DIAGNOSIS — M54.50 CHRONIC MIDLINE LOW BACK PAIN WITHOUT SCIATICA: ICD-10-CM

## 2023-07-13 DIAGNOSIS — G89.29 CHRONIC MIDLINE LOW BACK PAIN WITHOUT SCIATICA: ICD-10-CM

## 2023-07-13 PROCEDURE — 72100 X-RAY EXAM L-S SPINE 2/3 VWS: CPT

## 2023-08-18 ENCOUNTER — OFFICE VISIT (OUTPATIENT)
Age: 73
End: 2023-08-18

## 2023-08-18 VITALS
DIASTOLIC BLOOD PRESSURE: 80 MMHG | HEART RATE: 44 BPM | BODY MASS INDEX: 23.15 KG/M2 | WEIGHT: 166 LBS | OXYGEN SATURATION: 98 % | SYSTOLIC BLOOD PRESSURE: 130 MMHG

## 2023-08-18 DIAGNOSIS — R94.31 ABNORMAL ELECTROCARDIOGRAM (ECG) (EKG): Primary | ICD-10-CM

## 2023-08-18 RX ORDER — ROSUVASTATIN CALCIUM 5 MG/1
5 TABLET, COATED ORAL NIGHTLY
Qty: 30 TABLET | Refills: 3 | Status: SHIPPED | OUTPATIENT
Start: 2023-08-18

## 2023-08-18 NOTE — PROGRESS NOTES
[facility]    Hudson Mathew is 68 y.o. male with a history of Aortic regurgitation, DJD, borderline hypertension      Patient is here today for appointment he denies any prior history of MI or CHF. Denies any resting or exertional chest pain or chest pressure to suggest angina or any dyspnea to suggest heart failure. No presyncope or syncope  Denies any PND or LE edema. Taking all medications regularly. Forms exercise regularly without any complaint or limitation    Past Medical History:   Diagnosis Date    Agatston coronary artery calcium score less than 100 2/10/2014    Coronary calcium score of 82. Arthritis     right shoulder, SC joint, knees    Arthritis of knee     BPH (benign prostatic hyperplasia)     has tried avodart and flomax unsuccessfully in the 90s. ED (erectile dysfunction)     Giant cell granuloma     oral, lower inner gingiva    H/O colonoscopy 3/2005 / 03- / 9/2020    6mm polyp every 5 years based on pathology report    Hearing disorder, sensorineural 1/28/2014    Using aides    History of colonoscopy with polypectomy 09/02/2020    Dr. Sheila Nunez; internal hemorrhoids, mild diverticulosis,polyp(adenoma); f/u colonoscopy in 5 years    Hypercholesteremia     Neutropenia ([address]) 2016    Sees heme/onc    Onychomycosis     toenails    Pseudoexfoliation (PXF) glaucoma of both eyes 2021       Review of Systems:  Cardiac symptoms as noted above in HPI. All others negative. Current Outpatient Medications   Medication Sig    Multiple Vitamins-Minerals (CENTRUM SILVER 50+MEN) TABS Take 1 tablet by mouth daily    brimonidine-timolol (COMBIGAN) 0.2-0.5 % ophthalmic solution Apply 1 drop to eye in the morning and 1 drop in the evening.     cetirizine (ZYRTEC) 10 MG tablet Take 1 tablet by mouth daily as needed    ketorolac (ACULAR) 0.5 % ophthalmic solution INSTILL1 DROP INTO BOTH EYES DAILY    loteprednol (LOTEMAX) 0.5 %

## 2023-08-24 RX ORDER — ROSUVASTATIN CALCIUM 5 MG/1
5 TABLET, COATED ORAL NIGHTLY
Qty: 90 TABLET | Refills: 3 | Status: SHIPPED | OUTPATIENT
Start: 2023-08-24

## 2023-08-28 ENCOUNTER — TELEPHONE (OUTPATIENT)
Age: 73
End: 2023-08-28

## 2023-08-28 NOTE — TELEPHONE ENCOUNTER
Spoke with  Abhay Lashanda to follow up on his concerns about [facility] [place]. He states he received a letter from his insurance stating that [employer] is still participating with some PPO plans and that his out of pocket expense would be the same. Advised that we don't know what his out of expense will be and that this was effective August 01,2023. \" He states he will just go by the letter/EOB.

## 2023-08-28 NOTE — TELEPHONE ENCOUNTER
----- Message from Ky Simpson sent at 8/28/2023  9:16 AM EDT -----  Subject: Message to Provider    QUESTIONS  Information for Provider? Patient is wanting to get advice on if needing   to change providers due to insurance maybe being no longer accepted by   [facility]. Please call back to advise.  ---------------------------------------------------------------------------  --------------  Yas VILLASENOR  [phone]; OK to leave message on voicemail,OK to respond with electronic   message via [employer] portal (only for patients who have registered [employer]   account)  ---------------------------------------------------------------------------  --------------  SCRIPT ANSWERS  Relationship to Patient?  Self

## 2023-10-31 LAB
CHOLEST SERPL-MCNC: 191 MG/DL (ref 100–199)
HDLC SERPL-MCNC: 66 MG/DL
LDLC SERPL CALC-MCNC: 113 MG/DL (ref 0–99)
PSA SERPL-MCNC: 0.4 NG/ML (ref 0–4)
SPECIMEN STATUS REPORT: NORMAL
TRIGL SERPL-MCNC: 66 MG/DL (ref 0–149)
VLDLC SERPL CALC-MCNC: 12 MG/DL (ref 5–40)

## 2024-07-01 SDOH — ECONOMIC STABILITY: FOOD INSECURITY: WITHIN THE PAST 12 MONTHS, YOU WORRIED THAT YOUR FOOD WOULD RUN OUT BEFORE YOU GOT MONEY TO BUY MORE.: NEVER TRUE

## 2024-07-01 SDOH — ECONOMIC STABILITY: FOOD INSECURITY: WITHIN THE PAST 12 MONTHS, THE FOOD YOU BOUGHT JUST DIDN'T LAST AND YOU DIDN'T HAVE MONEY TO GET MORE.: NEVER TRUE

## 2024-07-01 SDOH — HEALTH STABILITY: PHYSICAL HEALTH: ON AVERAGE, HOW MANY DAYS PER WEEK DO YOU ENGAGE IN MODERATE TO STRENUOUS EXERCISE (LIKE A BRISK WALK)?: 6 DAYS

## 2024-07-01 SDOH — HEALTH STABILITY: PHYSICAL HEALTH: ON AVERAGE, HOW MANY MINUTES DO YOU ENGAGE IN EXERCISE AT THIS LEVEL?: 50 MIN

## 2024-07-01 SDOH — ECONOMIC STABILITY: INCOME INSECURITY: HOW HARD IS IT FOR YOU TO PAY FOR THE VERY BASICS LIKE FOOD, HOUSING, MEDICAL CARE, AND HEATING?: NOT HARD AT ALL

## 2024-07-01 ASSESSMENT — LIFESTYLE VARIABLES
HOW MANY STANDARD DRINKS CONTAINING ALCOHOL DO YOU HAVE ON A TYPICAL DAY: 1
HOW OFTEN DO YOU HAVE A DRINK CONTAINING ALCOHOL: 2-4 TIMES A MONTH
HOW MANY STANDARD DRINKS CONTAINING ALCOHOL DO YOU HAVE ON A TYPICAL DAY: 1 OR 2
HOW OFTEN DO YOU HAVE A DRINK CONTAINING ALCOHOL: 3
HOW OFTEN DO YOU HAVE SIX OR MORE DRINKS ON ONE OCCASION: 1

## 2024-07-01 ASSESSMENT — PATIENT HEALTH QUESTIONNAIRE - PHQ9
SUM OF ALL RESPONSES TO PHQ QUESTIONS 1-9: 0
SUM OF ALL RESPONSES TO PHQ QUESTIONS 1-9: 0
SUM OF ALL RESPONSES TO PHQ9 QUESTIONS 1 & 2: 0
SUM OF ALL RESPONSES TO PHQ QUESTIONS 1-9: 0
2. FEELING DOWN, DEPRESSED OR HOPELESS: NOT AT ALL
1. LITTLE INTEREST OR PLEASURE IN DOING THINGS: NOT AT ALL
SUM OF ALL RESPONSES TO PHQ QUESTIONS 1-9: 0

## 2024-07-01 NOTE — PATIENT INSTRUCTIONS
Patient Education        Well Visit, Over 65: Care Instructions  Well visits can help you stay healthy. Your doctor has checked your overall health and may have suggested ways to take good care of yourself. Your doctor also may have recommended tests. You can help prevent illness with healthy eating, good sleep, vaccinations, regular exercise, and other steps.    Get the tests that you and your doctor decide on. Depending on your age and risks, examples might include hearing tests as well as screening for colon, breast, and lung cancer. Screening helps find diseases before any symptoms appear.   Eat healthy foods. Choose fruits, vegetables, whole grains, lean protein, and low-fat dairy foods. Limit saturated fat, and reduce salt.     Limit alcohol. Men should have no more than 2 drinks a day. Women should have no more than 1. For some people, no alcohol is the best choice.   Exercise. It can help prevent falls. Get at least 30 minutes of exercise on most days of the week. Walking, yoga, and jose alejandro chi can be good choices.     Reach and stay at your healthy weight. This will lower your risk for many health problems.   Take care of your mental health. Try to stay connected with friends, family, and community, and find ways to manage stress.     If you're feeling depressed or hopeless, talk to someone. A counselor can help. If you don't have a counselor, talk to your doctor.   Talk to your doctor if you think you may have a problem with alcohol or drug use. This includes prescription medicines and illegal drugs.     Avoid tobacco and nicotine: Don't smoke, vape, or chew. If you need help quitting, talk to your doctor.   Practice safer sex. Getting tested, using condoms or dental dams, and limiting sex partners can help prevent STIs.     Make an advance directive. This is a legal way to tell your family and doctor what you want to happen at the end of your life or when you can't speak for yourself.   Prevent problems where

## 2024-07-02 ENCOUNTER — OFFICE VISIT (OUTPATIENT)
Facility: CLINIC | Age: 74
End: 2024-07-02
Payer: MEDICARE

## 2024-07-02 VITALS
BODY MASS INDEX: 23.94 KG/M2 | OXYGEN SATURATION: 97 % | DIASTOLIC BLOOD PRESSURE: 82 MMHG | RESPIRATION RATE: 16 BRPM | TEMPERATURE: 98 F | HEIGHT: 71 IN | SYSTOLIC BLOOD PRESSURE: 128 MMHG | HEART RATE: 54 BPM | WEIGHT: 171 LBS

## 2024-07-02 DIAGNOSIS — Z00.00 MEDICARE ANNUAL WELLNESS VISIT, SUBSEQUENT: Primary | ICD-10-CM

## 2024-07-02 DIAGNOSIS — Z12.5 ENCOUNTER FOR SCREENING FOR MALIGNANT NEOPLASM OF PROSTATE: ICD-10-CM

## 2024-07-02 DIAGNOSIS — E78.00 HYPERCHOLESTEROLEMIA: ICD-10-CM

## 2024-07-02 PROCEDURE — G0439 PPPS, SUBSEQ VISIT: HCPCS | Performed by: FAMILY MEDICINE

## 2024-07-02 PROCEDURE — 1123F ACP DISCUSS/DSCN MKR DOCD: CPT | Performed by: FAMILY MEDICINE

## 2024-07-02 RX ORDER — IBUPROFEN 400 MG/1
400 TABLET ORAL EVERY 6 HOURS PRN
COMMUNITY

## 2024-07-02 RX ORDER — SENNOSIDES 8.6 MG
650 CAPSULE ORAL EVERY 8 HOURS PRN
COMMUNITY

## 2024-07-02 NOTE — ACP (ADVANCE CARE PLANNING)
Advance Care Planning    Advance Care Planning (ACP) Provider Note - Comprehensive     Date of ACP Conversation: 07/2/24  Persons included in Conversation:  patient  Length of ACP Conversation in minutes:  16 minutes    Authorized Decision Maker (if patient is incapable of making informed decisions):   This person is:  Healthcare Agent/Medical Power of  under Advance Directive  Rupal Albright, wife  Dolores Albright, older daughter ()   Eli Albright, youngest daughter (pediatric GI physician).        General ACP for ALL Patients with Decision Making Capacity:   Importance of advance care planning, including choosing a healthcare agent to communicate patient's healthcare decisions if patient lost the ability to make decisions, such as after a sudden illness or accident  Understanding of the healthcare agent role was assessed and information provided    Review of Existing Advance Directive:    On file, reviewed.    For Serious or Chronic Illness:  No known illness    Interventions Provided:  Reviewed existing Advance Directive   Recommended communicating the plan and making copies for the healthcare agent, personal physician, and others as appropriate (e.g., health system)  Recommended review of completed ACP document annually or upon change in health status  Reviewed annually      Advance Care Planning

## 2024-07-02 NOTE — PROGRESS NOTES
\"Have you been to the ER, urgent care clinic since your last visit?  Hospitalized since your last visit?\"    NO    “Have you seen or consulted any other health care providers outside of [facility] since your last visit?”    NO            Click Here for Release of Records Request  
canal normal bilaterally, nose without deformity, nasal mucosa and turbinates normal without polyps  Neck: supple and non-tender without mass, no thyromegaly or thyroid nodules, no cervical lymphadenopathy  Pulmonary/Chest: clear to auscultation bilaterally- no wheezes, rales or rhonchi, normal air movement, no respiratory distress  Cardiovascular: normal rate, regular rhythm, normal S1 and S2, no murmurs  Abdomen: soft, non-tender, non-distended, normal bowel sounds, no masses or organomegaly  Extremities: no cyanosis, clubbing or edema      Allergies   Allergen Reactions    Benzocaine Swelling    Codeine Nausea Only     Prior to Visit Medications    Medication Sig Taking? Authorizing Provider   acetaminophen (TYLENOL 8 HOUR) 650 MG extended release tablet Take 1 tablet by mouth every 8 hours as needed for Pain Yes Mercy Armendariz MD   ibuprofen (ADVIL;MOTRIN) 400 MG tablet Take 1 tablet by mouth every 6 hours as needed for Pain Yes Mercy Armendariz MD   tamsulosin (FLOMAX) 0.4 MG capsule Take 1 capsule by mouth daily Yes Robert Merino APRN - NP   tadalafil (CIALIS) 5 MG tablet Take 1 tablet by mouth as needed for Erectile Dysfunction Yes Robert Merino APRN - NP   rosuvastatin (CRESTOR) 5 MG tablet Take 1 tablet by mouth nightly Yes Loan Marte PA-C   Multiple Vitamins-Minerals (CENTRUM SILVER 50+MEN) TABS Take 1 tablet by mouth daily Yes ProviderMercy MD   brimonidine-timolol (COMBIGAN) 0.2-0.5 % ophthalmic solution Apply 1 drop to eye in the morning and 1 drop in the evening. Yes Automatic Reconciliation, Ar   loteprednol (LOTEMAX) 0.5 % ophthalmic gel INSTILL 1 DROP INTO EACH EYE ONCE DAILY Yes Automatic Reconciliation, Ar   pilocarpine (PILOCAR) 4 % ophthalmic solution INSTILL 1 DROP INTO RIGHT EYE THREE TIMES DAILY Yes Automatic Reconciliation, Ar   cetirizine (ZYRTEC) 10 MG tablet Take 1 tablet by mouth daily as needed  Automatic Reconciliation, Ar       CareTeam (Including

## 2024-08-26 ENCOUNTER — OFFICE VISIT (OUTPATIENT)
Age: 74
End: 2024-08-26
Payer: MEDICARE

## 2024-08-26 VITALS
BODY MASS INDEX: 24.13 KG/M2 | OXYGEN SATURATION: 97 % | WEIGHT: 173 LBS | HEART RATE: 50 BPM | DIASTOLIC BLOOD PRESSURE: 50 MMHG | SYSTOLIC BLOOD PRESSURE: 130 MMHG

## 2024-08-26 DIAGNOSIS — E78.00 PURE HYPERCHOLESTEROLEMIA: Primary | ICD-10-CM

## 2024-08-26 DIAGNOSIS — I35.1 NONRHEUMATIC AORTIC VALVE INSUFFICIENCY: ICD-10-CM

## 2024-08-26 PROCEDURE — 99214 OFFICE O/P EST MOD 30 MIN: CPT | Performed by: INTERNAL MEDICINE

## 2024-08-26 PROCEDURE — 1123F ACP DISCUSS/DSCN MKR DOCD: CPT | Performed by: INTERNAL MEDICINE

## 2024-08-26 RX ORDER — ROSUVASTATIN CALCIUM 5 MG/1
5 TABLET, COATED ORAL NIGHTLY
Qty: 90 TABLET | Refills: 3 | Status: SHIPPED | OUTPATIENT
Start: 2024-08-26

## 2024-08-26 NOTE — PROGRESS NOTES
[facility]    Segundo Albright is 74 y.o. male with a history of Aortic regurgitation, DJD, borderline hypertension      Patient is here today for appointment he denies any prior history of MI or CHF.  Denies any resting or exertional chest pain or chest pressure to suggest angina or any dyspnea to suggest heart failure.   No presyncope or syncope  Denies any PND or LE edema.   Taking all medications regularly.  He exercises regularly.  He swims regularly without any symptoms  Forms exercise regularly without any complaint or limitation    Past Medical History:   Diagnosis Date    Agatston coronary artery calcium score less than 100 2/10/2014    Coronary calcium score of 82.    Arthritis     right shoulder, SC joint, knees    Arthritis of knee     BPH (benign prostatic hyperplasia)     has tried avodart and flomax unsuccessfully in the 90s.    ED (erectile dysfunction)     Giant cell granuloma     oral, lower inner gingiva    H/O colonoscopy 3/2005 / 03- / 9/2020    6mm polyp every 5 years based on pathology report    Hearing disorder, sensorineural 1/28/2014    Using aides    History of colonoscopy with polypectomy 09/02/2020    Dr. Cantrell; internal hemorrhoids, mild diverticulosis,polyp(adenoma); f/u colonoscopy in 5 years    Hypercholesteremia     Neutropenia ([facility]) 2016    Sees heme/onc    Onychomycosis     toenails    Pseudoexfoliation (PXF) glaucoma of both eyes 2021       Review of Systems:  Cardiac symptoms as noted above in HPI. All others negative.    Current Outpatient Medications   Medication Sig    acetaminophen (TYLENOL 8 HOUR) 650 MG extended release tablet Take 1 tablet by mouth every 8 hours as needed for Pain    ibuprofen (ADVIL;MOTRIN) 400 MG tablet Take 1 tablet by mouth every 6 hours as needed for Pain    tamsulosin (FLOMAX) 0.4 MG capsule Take 1 capsule by mouth daily    tadalafil (CIALIS) 5 MG tablet Take 1 tablet by

## 2024-10-31 LAB
CHOLEST SERPL-MCNC: 224 MG/DL
HDLC SERPL-MCNC: 83 MG/DL
LDLC SERPL CALC-MCNC: 128 MG/DL
PSA SERPL-MCNC: 0.5 NG/ML
SPECIMEN STATUS REPORT: NORMAL
TRIGL SERPL-MCNC: 73 MG/DL
VLDLC SERPL CALC-MCNC: 13 MG/DL (ref 5–40)

## 2025-01-04 SDOH — HEALTH STABILITY: PHYSICAL HEALTH: ON AVERAGE, HOW MANY MINUTES DO YOU ENGAGE IN EXERCISE AT THIS LEVEL?: 40 MIN

## 2025-01-04 SDOH — HEALTH STABILITY: PHYSICAL HEALTH: ON AVERAGE, HOW MANY DAYS PER WEEK DO YOU ENGAGE IN MODERATE TO STRENUOUS EXERCISE (LIKE A BRISK WALK)?: 6 DAYS

## 2025-01-06 ENCOUNTER — OFFICE VISIT (OUTPATIENT)
Age: 75
End: 2025-01-06
Payer: MEDICARE

## 2025-01-06 VITALS — WEIGHT: 180 LBS | HEIGHT: 71 IN | BODY MASS INDEX: 25.2 KG/M2

## 2025-01-06 DIAGNOSIS — M25.511 CHRONIC RIGHT SHOULDER PAIN: Primary | ICD-10-CM

## 2025-01-06 DIAGNOSIS — G89.29 CHRONIC RIGHT SHOULDER PAIN: Primary | ICD-10-CM

## 2025-01-06 DIAGNOSIS — M19.011 GLENOHUMERAL ARTHRITIS, RIGHT: ICD-10-CM

## 2025-01-06 PROCEDURE — 1123F ACP DISCUSS/DSCN MKR DOCD: CPT | Performed by: ORTHOPAEDIC SURGERY

## 2025-01-06 PROCEDURE — 1159F MED LIST DOCD IN RCRD: CPT | Performed by: ORTHOPAEDIC SURGERY

## 2025-01-06 PROCEDURE — 1125F AMNT PAIN NOTED PAIN PRSNT: CPT | Performed by: ORTHOPAEDIC SURGERY

## 2025-01-06 PROCEDURE — 20611 DRAIN/INJ JOINT/BURSA W/US: CPT | Performed by: ORTHOPAEDIC SURGERY

## 2025-01-06 PROCEDURE — 73030 X-RAY EXAM OF SHOULDER: CPT | Performed by: ORTHOPAEDIC SURGERY

## 2025-01-06 PROCEDURE — 99214 OFFICE O/P EST MOD 30 MIN: CPT | Performed by: ORTHOPAEDIC SURGERY

## 2025-01-06 PROCEDURE — 1160F RVW MEDS BY RX/DR IN RCRD: CPT | Performed by: ORTHOPAEDIC SURGERY

## 2025-01-06 RX ORDER — MELOXICAM 15 MG/1
15 TABLET ORAL DAILY
Qty: 30 TABLET | Refills: 3 | Status: SHIPPED | OUTPATIENT
Start: 2025-01-06

## 2025-01-06 RX ORDER — TRIAMCINOLONE ACETONIDE 40 MG/ML
40 INJECTION, SUSPENSION INTRA-ARTICULAR; INTRAMUSCULAR ONCE
Status: COMPLETED | OUTPATIENT
Start: 2025-01-06 | End: 2025-01-06

## 2025-01-06 RX ADMIN — TRIAMCINOLONE ACETONIDE 40 MG: 40 INJECTION, SUSPENSION INTRA-ARTICULAR; INTRAMUSCULAR at 10:40

## 2025-01-06 NOTE — PROGRESS NOTES
NOTE        Chart reviewed for the following:  Jovanni ZAABLA MD, have reviewed the History, Physical and updated the Allergic reactions for Segundo Albright     TIME OUT performed immediately prior to start of procedure:  Jovanni ZABALA MD have performed the following reviews on Segundo Albright prior to the start of the procedure:            * Patient was identified by name and date of birth   * Agreement on procedure being performed was verified  * Risks and Benefits explained to the patient  * Procedure site verified and marked as necessary  * Patient was positioned for comfort  * Consent was signed and verified     Time: 9:36 AM    Date of procedure: 1/6/2025    Procedure performed by:  Jovanni Hand MD    Provider assisted by: (see medication administration)    How tolerated by patient: tolerated the procedure well with no complications    Comments: none      IMAGING: XR of the right shoulder with 3 views obtained in office dated 1/6/2025 read and reviewed by myself reveal: Significant decreased joint space in the glenohumeral joint with sclerosis and small inferior humeral head spur      IMPRESSION:      ICD-10-CM    1. Chronic right shoulder pain  M25.511 [27700] Shoulder 2V or more    G89.29       2. Glenohumeral arthritis, right  M19.011 Ambulatory referral to Physical Therapy     meloxicam (MOBIC) 15 MG tablet     AMB POC US DRAIN/INJECT LARGE JOINT/BURSA     triamcinolone acetonide (KENALOG-40) injection 40 mg           PLAN:   1. Pt presents today with right shoulder pain secondary to glenohumeral arthritis. Due to his increasing pain I believe the next steps will be to inject the shoulder. I will order PT for him to begin actively moving his shoulder. I prescribed Mobic 15 mg for inflammatory pain.     2. Yes cortisone injection indicated today  3. Yes Physical/Occupational Therapy indicated today  4. No diagnostic test indicated today   5. No durable medical equipment indicated today  6.

## 2025-03-14 DIAGNOSIS — M19.011 GLENOHUMERAL ARTHRITIS, RIGHT: ICD-10-CM

## 2025-03-14 RX ORDER — MELOXICAM 15 MG/1
15 TABLET ORAL DAILY
Qty: 90 TABLET | Refills: 3 | Status: SHIPPED | OUTPATIENT
Start: 2025-03-14

## 2025-04-07 ENCOUNTER — HOSPITAL ENCOUNTER (OUTPATIENT)
Facility: HOSPITAL | Age: 75
Setting detail: RECURRING SERIES
Discharge: HOME | End: 2025-04-10
Payer: MEDICARE

## 2025-04-07 PROCEDURE — 97161 PT EVAL LOW COMPLEX 20 MIN: CPT

## 2025-04-07 PROCEDURE — 97535 SELF CARE MNGMENT TRAINING: CPT

## 2025-04-07 PROCEDURE — 97110 THERAPEUTIC EXERCISES: CPT

## 2025-04-07 NOTE — PROGRESS NOTES
PHYSICAL / OCCUPATIONAL THERAPY - DAILY TREATMENT NOTE     Patient Name: Segundo Albright    Date: 2025    : 1950  Insurance: Payor: INDRA TALYOR MEDICARE / Plan: [facility] OFFICES ONLY / Product Type: *No Product type* /      Patient  verified Yes     Visit #   Current / Total 1 24   Time   In / Out 910 945   Pain   In / Out 0 0   Subjective Functional Status/Changes: See eval   Changes to:  Allergies, Med Hx, Sx Hx?   no       TREATMENT AREA =  Right shoulder pain [M25.511]  Pain in right shoulder [M25.511]    If an interpreting service is utilized for treatment of this patient, the contents of this document represent the material reviewed with the patient via the .     OBJECTIVE      Therapeutic Procedures:  Tx Min Billable or 1:1 Min (if diff from Tx Min) Procedure, Rationale, Specifics   15  18977 Therapeutic Exercise (timed):  increase ROM, strength, coordination, balance, and proprioception to improve patient's ability to progress to PLOF and address remaining functional goals. (see flow sheet as applicable)    Details if applicable:       8  33075 Self Care/Home Management (timed):  improve patient knowledge and understanding of home injury/symptom/pain management  to improve patient's ability to progress to PLOF and address remaining functional goals.  (see flow sheet as applicable)    Details if applicable:            Details if applicable:           Details if applicable:            Details if applicable:     23  [facility] Totals Reminder: bill using total billable min of TIMED therapeutic procedures (example: do not include dry needle or estim unattended, both untimed codes, in totals to left)  8-22 min = 1 unit; 23-37 min = 2 units; 38-52 min = 3 units; 53-67 min = 4 units; 68-82 min = 5 units   Total Total     TOTAL TREATMENT TIME:        35     Charge Capture    [x]  Patient Education billed concurrently with other procedures   [x] Review HEP    [] Progressed/Changed HEP, detail:

## 2025-04-07 NOTE — PROGRESS NOTES
[facility]  [address] [facility] [address] [place] [address] Ph:[phone] Fx: [phone]    PLAN OF CARE/ Statement of Necessity for Physical Therapy Services           Patient name: Segundo Albright Start of Care: 2025   Referral source: Jovanni Gant,* : 1950    Medical Diagnosis: Right shoulder pain Onset Date: 25   Treatment Diagnosis: M25.511  RIGHT SHOULDER PAIN                                     Prior Hospitalization: see medical history Provider#: [id number]     Comorbidities: OA    Prior Level of Function: RHD; (I) with daily activities and ADLs; plays golf and swims    The Plan of Care and following information is based on the information from the initial evaluation.    Assessment / stallworth information:  74 y.o. year old male presents with CC of right shoulder pain that began 20 years ago and has worsened over the last year. Signs and symptoms are consistent with shoulder OA; impairments noted today: decreased left shoulder ER and IR AROM; decreased strength in right shoulder IR/ER and in middle and lower trap.  Patient will benefit from physical therapy to address deficits, and ultimately to return patient to prior level of function.      Evaluation Complexity:  History:  MEDIUM  Complexity : 1-2 comorbidities / personal factors will impact the outcome/ POC ; Examination:  MEDIUM Complexity : 3 Standardized tests and measures addressin body structure, function, activity limitation and / or participation in recreation  ;Presentation:  LOW Complexity : Stable, uncomplicated  ;Clinical Decision Making:  QuickDASH: Disability Arm, Shoulder, Hand = 39 % ; (0% - 40% Normal to Mild Disability) = LOW Complexity FOTO score = an established functional score where 100 = no disability  Overall Complexity Rating: LOW       Problem List: pain affecting function, decrease ROM, decrease strength, decrease ADL/functional abilities, and decrease

## 2025-04-14 ENCOUNTER — HOSPITAL ENCOUNTER (OUTPATIENT)
Facility: HOSPITAL | Age: 75
Setting detail: RECURRING SERIES
Discharge: HOME | End: 2025-04-17
Payer: MEDICARE

## 2025-04-14 PROCEDURE — 97112 NEUROMUSCULAR REEDUCATION: CPT

## 2025-04-14 PROCEDURE — 97140 MANUAL THERAPY 1/> REGIONS: CPT

## 2025-04-14 PROCEDURE — 97110 THERAPEUTIC EXERCISES: CPT

## 2025-04-14 NOTE — PROGRESS NOTES
PHYSICAL / OCCUPATIONAL THERAPY - DAILY TREATMENT NOTE     Patient Name: Segundo Albright    Date: 2025    : 1950  Insurance: Payor: INDRA TAYLOR MEDICARE / Plan: [facility] OFFICES ONLY / Product Type: *No Product type* /      Patient  verified Yes     Visit #   Current / Total 2 24   Time   In / Out 3:12 3:51   Pain   In / Out 0/10 0/10   Subjective Functional Status/Changes: Pt reports no new complaints    Changes to:  Allergies, Med Hx, Sx Hx?   no       TREATMENT AREA =  Right shoulder pain [M25.511]  Pain in right shoulder [M25.511]    If an interpreting service is utilized for treatment of this patient, the contents of this document represent the material reviewed with the patient via the .     OBJECTIVE        Therapeutic Procedures:  Tx Min Billable or 1:1 Min (if diff from Tx Min) Procedure, Rationale, Specifics   20  65342 Therapeutic Exercise (timed):  increase ROM, strength, coordination, balance, and proprioception to improve patient's ability to progress to PLOF and address remaining functional goals. (see flow sheet as applicable)    Details if applicable:       11  38367 Neuromuscular Re-Education (timed):  improve balance, coordination, kinesthetic sense, posture, core stability and proprioception to improve patient's ability to develop conscious control of individual muscles and awareness of position of extremities in order to progress to PLOF and address remaining functional goals. (see flow sheet as applicable)    Details if applicable:     8  77967 Manual Therapy (timed):  decrease pain, increase ROM, increase tissue extensibility, and decrease trigger points to improve patient's ability to progress to PLOF and address remaining functional goals.  The manual therapy interventions were performed at a separate and distinct time from the therapeutic activities interventions . Details: Transverse friction to right bicep tendon, post/inf GHJ grade II mobs      Details if

## 2025-04-18 ENCOUNTER — HOSPITAL ENCOUNTER (OUTPATIENT)
Facility: HOSPITAL | Age: 75
Setting detail: RECURRING SERIES
Discharge: HOME | End: 2025-04-21
Payer: MEDICARE

## 2025-04-18 PROCEDURE — 97112 NEUROMUSCULAR REEDUCATION: CPT

## 2025-04-18 PROCEDURE — 97110 THERAPEUTIC EXERCISES: CPT

## 2025-04-18 PROCEDURE — 97140 MANUAL THERAPY 1/> REGIONS: CPT

## 2025-04-18 NOTE — PROGRESS NOTES
PHYSICAL / OCCUPATIONAL THERAPY - DAILY TREATMENT NOTE     Patient Name: Segundo Albright    Date: 2025    : 1950  Insurance: Payor: INDRA TAYLOR MEDICARE / Plan: [facility] OFFICES ONLY / Product Type: *No Product type* /      Patient  verified Yes     Visit #   Current / Total 3 24   Time   In / Out 311 351   Pain   In / Out 0 0   Subjective Functional Status/Changes: No pain right now. I was sore after last time.I can reach around and wash my left armpit now.    Changes to:  Allergies, Med Hx, Sx Hx?   no       TREATMENT AREA =  Right shoulder pain [M25.511]  Pain in right shoulder [M25.511]    If an interpreting service is utilized for treatment of this patient, the contents of this document represent the material reviewed with the patient via the .     OBJECTIVE    Therapeutic Procedures:  Tx Min Billable or 1:1 Min (if diff from Tx Min) Procedure, Rationale, Specifics   22  97077 Therapeutic Exercise (timed):  increase ROM, strength, coordination, balance, and proprioception to improve patient's ability to progress to PLOF and address remaining functional goals. (see flow sheet as applicable)    Details if applicable:       10  57600 Neuromuscular Re-Education (timed):  improve balance, coordination, kinesthetic sense, posture, core stability and proprioception to improve patient's ability to develop conscious control of individual muscles and awareness of position of extremities in order to progress to PLOF and address remaining functional goals. (see flow sheet as applicable)    Details if applicable:     8  34956 Manual Therapy (timed):  decrease pain and increase ROM to improve patient's ability to progress to PLOF and address remaining functional goals.  The manual therapy interventions were performed at a separate and distinct time from the therapeutic activities interventions . Details: grade IV GHJ post /inf mobs/ PROM; STM right teres/infra      Details if applicable:

## 2025-04-21 ENCOUNTER — HOSPITAL ENCOUNTER (OUTPATIENT)
Facility: HOSPITAL | Age: 75
Setting detail: RECURRING SERIES
Discharge: HOME | End: 2025-04-24
Payer: MEDICARE

## 2025-04-21 PROCEDURE — 97140 MANUAL THERAPY 1/> REGIONS: CPT

## 2025-04-21 PROCEDURE — 97110 THERAPEUTIC EXERCISES: CPT

## 2025-04-21 PROCEDURE — 97112 NEUROMUSCULAR REEDUCATION: CPT

## 2025-04-28 ENCOUNTER — HOSPITAL ENCOUNTER (OUTPATIENT)
Facility: HOSPITAL | Age: 75
Setting detail: RECURRING SERIES
Discharge: HOME | End: 2025-05-01
Payer: MEDICARE

## 2025-04-28 PROCEDURE — 97112 NEUROMUSCULAR REEDUCATION: CPT

## 2025-04-28 PROCEDURE — 97140 MANUAL THERAPY 1/> REGIONS: CPT

## 2025-04-28 PROCEDURE — 97110 THERAPEUTIC EXERCISES: CPT

## 2025-04-28 NOTE — PROGRESS NOTES
applicable:     38  [facility] Totals Reminder: bill using total billable min of TIMED therapeutic procedures (example: do not include dry needle or estim unattended, both untimed codes, in totals to left)  8-22 min = 1 unit; 23-37 min = 2 units; 38-52 min = 3 units; 53-67 min = 4 units; 68-82 min = 5 units   Total Total     TOTAL TREATMENT TIME:        38     Charge Capture    [x]  Patient Education billed concurrently with other procedures   [x] Review HEP    [] Progressed/Changed HEP, detail:    [] Other detail:       Objective Information/Functional Measures/Assessment    Right middle and lower trap strength has improved by 1/2 grade.     Patient will continue to benefit from skilled PT / OT services to modify and progress therapeutic interventions, analyze and address functional mobility deficits, analyze and address ROM deficits, analyze and address strength deficits, analyze and address soft tissue restrictions, and analyze and cue for proper movement patterns to address functional deficits and attain remaining goals.    Progress toward goals / Updated goals:  []  See Progress Note/Recertification    Short Term Goals: To be accomplished in 2 weeks  1. I and compliant with HEP for self management of symptoms.   IE: issued HEP  Current: Pt reports compliance. 4/14/2025.  2. Increase right shoulder functional IR to L1 to increase ease with washing back.  IE:S1  Current: L3 4/18/25     Long Term Goals: To be accomplished in 12 weeks  Decrease DASH to 15% to indicate improvement with functional mobility.   IE:39%  2. Increase right shoulder IR/ER strength to  4+/5 to improve stability for daily activities.  IE:3/5  Current 4/21/2025: MMT Right shoulder IR 4+/5, ER 4-/5.   3. Increase right middle and lower trap strength to 4/5 to improve stability for golf and swimming.   IE:middle trap 3+/5; lower trap 3/5   Current:middle trap 4/5; lower trap 3+/5 4/28/25  PLAN  Yes  Continue plan of care  []  Upgrade activities as

## 2025-04-30 NOTE — PATIENT INSTRUCTIONS

## 2025-04-30 NOTE — PROGRESS NOTES
Chief Complaint   Patient presents with    Dysphagia     Concerns / complaining of food being stuck when he swallows for approximately 3 years, belching, burping and some discomfort in chest area.      Have you been to the ER, urgent care clinic since your last visit?  Hospitalized since your last visit?   NO    Have you seen or consulted any other health care providers outside our system since your last visit?   YES, under the care of hematology for neutropenia, and [facility] for glaucoma, cataract, and keratoconjunctivitis sicca    No updated immunizations noted on [place] Immunization Registry as of 04/30/2025

## 2025-05-02 ENCOUNTER — HOSPITAL ENCOUNTER (OUTPATIENT)
Facility: HOSPITAL | Age: 75
Setting detail: RECURRING SERIES
Discharge: HOME | End: 2025-05-05
Payer: MEDICARE

## 2025-05-02 PROCEDURE — 97140 MANUAL THERAPY 1/> REGIONS: CPT

## 2025-05-02 PROCEDURE — 97110 THERAPEUTIC EXERCISES: CPT

## 2025-05-02 PROCEDURE — 97112 NEUROMUSCULAR REEDUCATION: CPT

## 2025-05-02 SDOH — ECONOMIC STABILITY: FOOD INSECURITY: WITHIN THE PAST 12 MONTHS, THE FOOD YOU BOUGHT JUST DIDN'T LAST AND YOU DIDN'T HAVE MONEY TO GET MORE.: NEVER TRUE

## 2025-05-02 SDOH — ECONOMIC STABILITY: INCOME INSECURITY: IN THE LAST 12 MONTHS, WAS THERE A TIME WHEN YOU WERE NOT ABLE TO PAY THE MORTGAGE OR RENT ON TIME?: NO

## 2025-05-02 SDOH — ECONOMIC STABILITY: TRANSPORTATION INSECURITY
IN THE PAST 12 MONTHS, HAS THE LACK OF TRANSPORTATION KEPT YOU FROM MEDICAL APPOINTMENTS OR FROM GETTING MEDICATIONS?: NO

## 2025-05-02 SDOH — ECONOMIC STABILITY: FOOD INSECURITY: WITHIN THE PAST 12 MONTHS, YOU WORRIED THAT YOUR FOOD WOULD RUN OUT BEFORE YOU GOT MONEY TO BUY MORE.: NEVER TRUE

## 2025-05-02 NOTE — PROGRESS NOTES
UT, lev scap, infraspinatus. Pt left side-lying. Right GH inf/post joint mobs grade III. Gentle CFM to bicipital groove area. PROM Right shoulder flex, scap, ER. Pt supine.    39 38 [facility] BC Totals Reminder: bill using total billable min of TIMED therapeutic procedures (example: do not include dry needle or estim unattended, both untimed codes, in totals to left)  8-22 min = 1 unit; 23-37 min = 2 units; 38-52 min = 3 units; 53-67 min = 4 units; 68-82 min = 5 units   Total Total     TOTAL TREATMENT TIME:        39     Charge Capture    [x]  Patient Education billed concurrently with other procedures   [x] Review HEP    [] Progressed/Changed HEP, detail:    [] Other detail:       Objective Information/Functional Measures/Assessment    Cueing for scapular retraction. Continues to have pain with side-lying long level abd. Demonstrates improved AROM, able to reach Left armpit but painful. Notable crepitus with AROM. Denied pain post-treatment.    Patient will continue to benefit from skilled PT / OT services to modify and progress therapeutic interventions, analyze and address functional mobility deficits, analyze and address ROM deficits, analyze and address strength deficits, analyze and address soft tissue restrictions, and analyze and cue for proper movement patterns to address functional deficits and attain remaining goals.    Progress toward goals / Updated goals:  []  See Progress Note/Recertification    Short Term Goals: To be accomplished in 2 weeks  1. I and compliant with HEP for self management of symptoms.   IE: issued HEP  Current: Pt reports compliance. 4/14/2025.  2. Increase right shoulder functional IR to L1 to increase ease with washing back.  IE:S1  Current: L3 4/18/25     Long Term Goals: To be accomplished in 12 weeks  Decrease DASH to 15% to indicate improvement with functional mobility.   IE:39%  2. Increase right shoulder IR/ER strength to  4+/5 to improve stability for daily

## 2025-05-05 ENCOUNTER — OFFICE VISIT (OUTPATIENT)
Facility: CLINIC | Age: 75
End: 2025-05-05
Payer: MEDICARE

## 2025-05-05 ENCOUNTER — APPOINTMENT (OUTPATIENT)
Facility: HOSPITAL | Age: 75
End: 2025-05-05
Payer: MEDICARE

## 2025-05-05 VITALS
RESPIRATION RATE: 18 BRPM | HEART RATE: 71 BPM | TEMPERATURE: 98.9 F | OXYGEN SATURATION: 99 % | DIASTOLIC BLOOD PRESSURE: 80 MMHG | WEIGHT: 183 LBS | SYSTOLIC BLOOD PRESSURE: 130 MMHG | BODY MASS INDEX: 25.62 KG/M2 | HEIGHT: 71 IN

## 2025-05-05 DIAGNOSIS — R13.19 ESOPHAGEAL DYSPHAGIA: Primary | ICD-10-CM

## 2025-05-05 PROCEDURE — 1126F AMNT PAIN NOTED NONE PRSNT: CPT | Performed by: FAMILY MEDICINE

## 2025-05-05 PROCEDURE — 1159F MED LIST DOCD IN RCRD: CPT | Performed by: FAMILY MEDICINE

## 2025-05-05 PROCEDURE — 99212 OFFICE O/P EST SF 10 MIN: CPT | Performed by: FAMILY MEDICINE

## 2025-05-05 PROCEDURE — 1123F ACP DISCUSS/DSCN MKR DOCD: CPT | Performed by: FAMILY MEDICINE

## 2025-05-05 PROCEDURE — 1160F RVW MEDS BY RX/DR IN RCRD: CPT | Performed by: FAMILY MEDICINE

## 2025-05-05 ASSESSMENT — PATIENT HEALTH QUESTIONNAIRE - PHQ9
SUM OF ALL RESPONSES TO PHQ QUESTIONS 1-9: 0
1. LITTLE INTEREST OR PLEASURE IN DOING THINGS: NOT AT ALL
2. FEELING DOWN, DEPRESSED OR HOPELESS: NOT AT ALL
SUM OF ALL RESPONSES TO PHQ QUESTIONS 1-9: 0

## 2025-05-05 NOTE — PROGRESS NOTES
Chief Complaint   Patient presents with    Dysphagia     Concerns / complaining of food being stuck when he swallows for approximately 3 years, belching, burping and some discomfort in chest area.     SUBJECTIVE    Patient presents complaining swallowing issues the past 3 years.   He sent us a [employer] message that reads:    \"I have had instances of food being stuck when I swallowed for approximately 3 years.  It happens irregularly , with intervals of anywhere from a couple days to several months.  It usually occurs when I swallow rice or chicken, sometimes pork.  Each occurrence might take anywhere from 30 seconds to 45 minutes to clear the obstruction. Saturday, May 26, was a severe occurrence- took about 1/2 hour to clear.  It feels like the blockage is below my upper sternum and results in chest pain, belching, gagging, coughing and spitting to clear the mucus backup so I can breathe.  Other possible related symptoms- belching and gulping in the morning- not bothersome.  I have no heartburn.  I tried taking Prilosec daily for about six months and it had no effect.   My daughter suggested ordering an an esophogram to look for narrowing or external compression of the esophagus (\"achalasia).  She also suggested referral for an upper endoscopy to look for esophageal stricture, esophagitis or other esophageal problem.\"    Has had this with liquids but seldom and usually with cold water guzzling.  The patient denies any melena or hematochezia.    The patient denies diarrhea or constipation.    The patient denies any nausea or vomiting.    The patient denies any fevers, chills, or night sweats.    No weight loss.      OBJECTIVE  Blood pressure 130/80, pulse 71, temperature 98.9 °F (37.2 °C), temperature source Skin, resp. rate 18, height 1.803 m (5' 11\"), weight 83 kg (183 lb), SpO2 99%.  General:  alert, cooperative, well appearing, in no apparent distress.  Eyes:   The conjunctiva is clear and noninjected.  There is

## 2025-05-06 ENCOUNTER — HOSPITAL ENCOUNTER (OUTPATIENT)
Facility: HOSPITAL | Age: 75
Setting detail: RECURRING SERIES
Discharge: HOME | End: 2025-05-09
Payer: MEDICARE

## 2025-05-06 PROCEDURE — 97112 NEUROMUSCULAR REEDUCATION: CPT

## 2025-05-06 PROCEDURE — 97140 MANUAL THERAPY 1/> REGIONS: CPT

## 2025-05-06 PROCEDURE — 97110 THERAPEUTIC EXERCISES: CPT

## 2025-05-06 NOTE — PROGRESS NOTES
PHYSICAL / OCCUPATIONAL THERAPY - DAILY TREATMENT NOTE     Patient Name: Segundo Albright    Date: 2025    : 1950  Insurance: Payor: IN[employer]A TAYLOR MEDICARE / Plan: [facility] MEDICARE / Product Type: *No Product type* /      Patient  verified Yes     Visit #   Current / Total 7 24   Time   In / Out 231 309   Pain   In / Out 0 0   Subjective Functional Status/Changes: No new complaints.   Changes to:  Allergies, Med Hx, Sx Hx?   no       TREATMENT AREA =  Right shoulder pain [M25.511]  Pain in right shoulder [M25.511]    If an interpreting service is utilized for treatment of this patient, the contents of this document represent the material reviewed with the patient via the .     OBJECTIVE    Therapeutic Procedures:  Tx Min Billable or 1:1 Min (if diff from Tx Min) Procedure, Rationale, Specifics   20  34553 Therapeutic Exercise (timed):  increase ROM, strength, coordination, balance, and proprioception to improve patient's ability to progress to PLOF and address remaining functional goals. (see flow sheet as applicable)    Details if applicable:       10  53732 Neuromuscular Re-Education (timed):  improve balance, coordination, kinesthetic sense, posture, core stability and proprioception to improve patient's ability to develop conscious control of individual muscles and awareness of position of extremities in order to progress to PLOF and address remaining functional goals. (see flow sheet as applicable)    Details if applicable:     8  17778 Manual Therapy (timed):  decrease pain and increase ROM to improve patient's ability to progress to PLOF and address remaining functional goals.  The manual therapy interventions were performed at a separate and distinct time from the therapeutic activities interventions . Details:        Details if applicable:  Right ST joint clocks. DTM Right UT, lev scap, infraspinatus. Pt left side-lying. Right GH inf/post joint mobs grade III. Gentle CFM to bicipital

## 2025-05-09 ENCOUNTER — HOSPITAL ENCOUNTER (OUTPATIENT)
Facility: HOSPITAL | Age: 75
Setting detail: RECURRING SERIES
Discharge: HOME | End: 2025-05-12
Payer: MEDICARE

## 2025-05-09 PROCEDURE — 97112 NEUROMUSCULAR REEDUCATION: CPT

## 2025-05-09 PROCEDURE — 97110 THERAPEUTIC EXERCISES: CPT

## 2025-05-09 PROCEDURE — 97140 MANUAL THERAPY 1/> REGIONS: CPT

## 2025-05-09 NOTE — PROGRESS NOTES
PHYSICAL / OCCUPATIONAL THERAPY - DAILY TREATMENT NOTE     Patient Name: Segundo Albright    Date: 2025    : 1950  Insurance: Payor: IN[employer]A TAYLOR MEDICARE / Plan: [facility] MEDICARE / Product Type: *No Product type* /      Patient  verified Yes     Visit #   Current / Total 8 24   Time   In / Out 306 348   Pain   In / Out 0 0   Subjective Functional Status/Changes: No pain right now; mainly when I'm doing something or when I'm sleeping.   Changes to:  Allergies, Med Hx, Sx Hx?   Yes- I stopped taking NSAIDS and Meloxicam     TREATMENT AREA =  Right shoulder pain [M25.511]  Pain in right shoulder [M25.511]    If an interpreting service is utilized for treatment of this patient, the contents of this document represent the material reviewed with the patient via the .     OBJECTIVE    Therapeutic Procedures:  Tx Min Billable or 1:1 Min (if diff from Tx Min) Procedure, Rationale, Specifics   24  36641 Therapeutic Exercise (timed):  increase ROM, strength, coordination, balance, and proprioception to improve patient's ability to progress to PLOF and address remaining functional goals. (see flow sheet as applicable)    Details if applicable:       10  71027 Neuromuscular Re-Education (timed):  improve balance, coordination, kinesthetic sense, posture, core stability and proprioception to improve patient's ability to develop conscious control of individual muscles and awareness of position of extremities in order to progress to PLOF and address remaining functional goals. (see flow sheet as applicable)    Details if applicable:     8  80908 Manual Therapy (timed):  decrease pain and increase ROM to improve patient's ability to progress to PLOF and address remaining functional goals.  The manual therapy interventions were performed at a separate and distinct time from the therapeutic activities interventions . Details:        Details if applicable:   Right ST joint clocks. DTM Right UT, lev scap,

## 2025-05-12 ENCOUNTER — HOSPITAL ENCOUNTER (OUTPATIENT)
Facility: HOSPITAL | Age: 75
Setting detail: RECURRING SERIES
Discharge: HOME | End: 2025-05-15
Payer: MEDICARE

## 2025-05-12 PROCEDURE — 97112 NEUROMUSCULAR REEDUCATION: CPT

## 2025-05-12 PROCEDURE — 97110 THERAPEUTIC EXERCISES: CPT

## 2025-05-12 NOTE — PROGRESS NOTES
PHYSICAL / OCCUPATIONAL THERAPY - DAILY TREATMENT NOTE     Patient Name: Segundo Albright    Date: 2025    : 1950  Insurance: Payor: INDRA TAYLOR MEDICARE / Plan: [facility] [place] MEDIC[facility] / Product Type: *No Product type* /      Patient  verified Yes     Visit #   Current / Total 9 24   Time   In / Out 3:06 3:47   Pain   In / Out 0 0   Subjective Functional Status/Changes: Pt reports right shoulder pain increases with swimming.   Changes to:  Allergies, Med Hx, Sx Hx?   no       TREATMENT AREA =  Right shoulder pain [M25.511]  Pain in right shoulder [M25.511]    If an interpreting service is utilized for treatment of this patient, the contents of this document represent the material reviewed with the patient via the .     OBJECTIVE      Therapeutic Procedures:  Tx Min Billable or 1:1 Min (if diff from Tx Min) Procedure, Rationale, Specifics   27  25381 Therapeutic Exercise (timed):  increase ROM, strength, coordination, balance, and proprioception to improve patient's ability to progress to PLOF and address remaining functional goals. (see flow sheet as applicable)    Details if applicable:       14  93761 Neuromuscular Re-Education (timed):  improve balance, coordination, kinesthetic sense, posture, core stability and proprioception to improve patient's ability to develop conscious control of individual muscles and awareness of position of extremities in order to progress to PLOF and address remaining functional goals. (see flow sheet as applicable)    Details if applicable:            Details if applicable:           Details if applicable:            Details if applicable:     41  [facility] Totals Reminder: bill using total billable min of TIMED therapeutic procedures (example: do not include dry needle or estim unattended, both untimed codes, in totals to left)  8-22 min = 1 unit; 23-37 min = 2 units; 38-52 min = 3 units; 53-67 min = 4 units; 68-82 min = 5 units   Total Total     TOTAL TREATMENT TIME:

## 2025-05-15 NOTE — PROGRESS NOTES
Segundo Albright  1950   Chief Complaint   Patient presents with    Follow-up     Right shoulder pain        HISTORY OF PRESENT ILLNESS  Segundo Albright is a 75 y.o. male who presents today for reevaluation of right shoulder pain. Pain is a 1/10. Pt received a right shoulder cortisone injection last OV on 1/6/2025 in which his shoulder did not respond to. He is compliant with PT. His ROM has increased. Still having an achy pain with reaching behind and cross body movement.  Notes he had to discontinue taking Mobic due to having GI issues. He is still able to engage in activities such as swimming and golfing.    Patient denies any fever, chills, chest pain, shortness of breath or calf pain. The remainder of the review of systems is negative. There are no new illness or injuries other than that mentioned above to report since last seen in the office. No changes in medications, allergies, social or family history.      PHYSICAL EXAM:   There were no vitals taken for this visit.  The patient is a well-developed, well-nourished male   in no acute distress.  The patient is alert and oriented times three.  The patient is alert and oriented times three. Mood and affect are normal.  LYMPHATIC: lymph nodes are not enlarged and are within normal limits  SKIN: normal in color and non tender to palpation. There are no bruises or abrasions noted.   NEUROLOGICAL: Motor sensory exam is within normal limits. Reflexes are equal bilaterally. There is normal sensation to pinprick and light touch  MUSCULOSKELETAL:  Unchanged    PROCEDURE: none    IMAGING: XR of the right shoulder with 3 views obtained in office dated 1/6/2025 read and reviewed by myself reveal: Significant decreased joint space in the glenohumeral joint with sclerosis and small inferior humeral head spur          IMPRESSION:      ICD-10-CM    1. Glenohumeral arthritis, right  M19.011 MRI SHOULDER RIGHT WO CONTRAST           PLAN:   1. Pt presents today

## 2025-05-16 ENCOUNTER — HOSPITAL ENCOUNTER (OUTPATIENT)
Facility: HOSPITAL | Age: 75
Setting detail: RECURRING SERIES
Discharge: HOME | End: 2025-05-19
Payer: MEDICARE

## 2025-05-16 PROCEDURE — 97110 THERAPEUTIC EXERCISES: CPT

## 2025-05-16 PROCEDURE — 97112 NEUROMUSCULAR REEDUCATION: CPT

## 2025-05-16 PROCEDURE — 97530 THERAPEUTIC ACTIVITIES: CPT

## 2025-05-16 NOTE — PROGRESS NOTES
PHYSICAL / OCCUPATIONAL THERAPY - DAILY TREATMENT NOTE     Patient Name: Segundo Albright    Date: 2025    : 1950  Insurance: Payor: INDRA TAYLOR MEDICARE / Plan: [facility] MEDICARE / Product Type: *No Product type* /      Patient  verified Yes     Visit #   Current / Total 10 24   Time   In / Out 305 346   Pain   In / Out 1 1   Subjective Functional Status/Changes: It's been bothering me today.   Changes to:  Allergies, Med Hx, Sx Hx?   no       TREATMENT AREA =  Right shoulder pain [M25.511]  Pain in right shoulder [M25.511]    If an interpreting service is utilized for treatment of this patient, the contents of this document represent the material reviewed with the patient via the .     OBJECTIVE      Therapeutic Procedures:  Tx Min Billable or 1:1 Min (if diff from Tx Min) Procedure, Rationale, Specifics   21  32751 Therapeutic Exercise (timed):  increase ROM, strength, coordination, balance, and proprioception to improve patient's ability to progress to PLOF and address remaining functional goals. (see flow sheet as applicable)    Details if applicable:       10  78128 Neuromuscular Re-Education (timed):  improve balance, coordination, kinesthetic sense, posture, core stability and proprioception to improve patient's ability to develop conscious control of individual muscles and awareness of position of extremities in order to progress to PLOF and address remaining functional goals. (see flow sheet as applicable)    Details if applicable:     10  57051 Therapeutic Activity (timed):  use of dynamic activities replicating functional movements to increase ROM, strength, coordination, balance, and proprioception in order to improve patient's ability to progress to PLOF and address remaining functional goals.  (see flow sheet as applicable)     Details if applicable:           Details if applicable:            Details if applicable:     41  [facility] Totals Reminder: bill using total billable min of

## 2025-05-19 ENCOUNTER — OFFICE VISIT (OUTPATIENT)
Age: 75
End: 2025-05-19
Payer: MEDICARE

## 2025-05-19 ENCOUNTER — HOSPITAL ENCOUNTER (OUTPATIENT)
Facility: HOSPITAL | Age: 75
Setting detail: RECURRING SERIES
Discharge: HOME | End: 2025-05-22
Payer: MEDICARE

## 2025-05-19 DIAGNOSIS — M19.011 GLENOHUMERAL ARTHRITIS, RIGHT: Primary | ICD-10-CM

## 2025-05-19 PROCEDURE — 97110 THERAPEUTIC EXERCISES: CPT

## 2025-05-19 PROCEDURE — 99213 OFFICE O/P EST LOW 20 MIN: CPT | Performed by: ORTHOPAEDIC SURGERY

## 2025-05-19 PROCEDURE — 1159F MED LIST DOCD IN RCRD: CPT | Performed by: ORTHOPAEDIC SURGERY

## 2025-05-19 PROCEDURE — 1160F RVW MEDS BY RX/DR IN RCRD: CPT | Performed by: ORTHOPAEDIC SURGERY

## 2025-05-19 PROCEDURE — 1125F AMNT PAIN NOTED PAIN PRSNT: CPT | Performed by: ORTHOPAEDIC SURGERY

## 2025-05-19 PROCEDURE — 97112 NEUROMUSCULAR REEDUCATION: CPT

## 2025-05-19 PROCEDURE — 1123F ACP DISCUSS/DSCN MKR DOCD: CPT | Performed by: ORTHOPAEDIC SURGERY

## 2025-05-19 PROCEDURE — 97530 THERAPEUTIC ACTIVITIES: CPT

## 2025-05-19 NOTE — PROGRESS NOTES
Physical Therapy Discharge Instructions      [facility]  [address]  [place] [address] [phone] fax    Patient: Segundo Albright  : 1950      Continue Home Exercise Program 2 times per day for 4 weeks, then decrease to 4 times per week      Continue with    [] Ice  as needed prn times per day     [] Heat           Follow up with MD:     [] Upon completion of therapy     [] As needed      Recommendations:     []   Return to activity with home program    []   Return to activity with the following modifications:       []Post Rehab Program    []Join Independent aquatic program     []Return to/join local gym        Additional Comments: SILVIO Adams, PT 2025 1:30 PM

## 2025-05-19 NOTE — PROGRESS NOTES
PT DISCHARGE DAILY NOTE AND SUMMARY 3-25    If signature is requested please return to:    [facility]  Fax: [phone]    Date:2025  Patient name: Segundo Albright Start of Care: 25   Referral source: Jovanni Gant,* : 1950   Medical/Treatment Diagnosis: Right shoulder pain Onset Date:25     Prior Hospitalization: see medical history Provider#: [id number]   Comorbidities: OA     Prior Level of Function: RHD; (I) with daily activities and ADLs; plays golf and swims  Insurance: Payor: [place] [facility] MEDICARE / Plan: [facility] MEDICARE / Product Type: *No Product type* /      Visits from Start of Care: 11 Missed Visits: 1    Medicare: Reporting Period (date from last assessment to current assessment): 25-25    Patient  verified yes     Visit #   Current / Total 11 24   Time   In / Out 107 148   Pain   In / Out 0 0   Subjective Functional Status/Changes: They are going to do a MRI     TREATMENT AREA =  Right shoulder pain      OBJECTIVE       Therapeutic Procedures:    Tx Min Billable or 1:1 Min (if diff from Tx Min) Procedure, Rationale, Specifics   21  21083 Therapeutic Exercise (timed):  increase ROM, strength, coordination, balance, and proprioception to improve patient's ability to progress to PLOF and address remaining functional goals. (see flow sheet as applicable)     Details if applicable:       10  89638 Neuromuscular Re-Education (timed):  improve balance, coordination, kinesthetic sense, posture, core stability and proprioception to improve patient's ability to develop conscious control of individual muscles and awareness of position of extremities in order to progress to PLOF and address remaining functional goals. (see flow sheet as applicable)     Details if applicable:     10  71454 Therapeutic Activity (timed):  use of dynamic activities replicating functional movements to increase ROM, strength, coordination, balance, and proprioception in order to

## 2025-05-19 NOTE — PATIENT INSTRUCTIONS
If we order a Diagnostic test (such as MRI or CT) during your office visit please see below:     Coordination of Care will be calling you to schedule your diagnostic test. If you have not heard from Coordination of Care within 3 business days, please call or text [phone].     Once you have a date scheduled for your diagnostic test, you will need to contact our office to schedule a follow up appointment, as this is when the physician will review your diagnostic test results with you. You can contact our office to schedule appointment by phone at [phone], or you can send a message via [employer] to request an appointment.    MRI ordered today, 5/19/2025

## 2025-05-28 ENCOUNTER — HOSPITAL ENCOUNTER (OUTPATIENT)
Age: 75
Discharge: HOME | End: 2025-05-31
Attending: ORTHOPAEDIC SURGERY
Payer: MEDICARE

## 2025-05-28 DIAGNOSIS — M19.011 GLENOHUMERAL ARTHRITIS, RIGHT: ICD-10-CM

## 2025-05-28 PROCEDURE — 73221 MRI JOINT UPR EXTREM W/O DYE: CPT

## 2025-06-03 ENCOUNTER — OFFICE VISIT (OUTPATIENT)
Age: 75
End: 2025-06-03
Payer: MEDICARE

## 2025-06-03 DIAGNOSIS — M25.511 CHRONIC RIGHT SHOULDER PAIN: ICD-10-CM

## 2025-06-03 DIAGNOSIS — M19.011 GLENOHUMERAL ARTHRITIS, RIGHT: Primary | ICD-10-CM

## 2025-06-03 DIAGNOSIS — G89.29 CHRONIC RIGHT SHOULDER PAIN: ICD-10-CM

## 2025-06-03 PROCEDURE — 1159F MED LIST DOCD IN RCRD: CPT | Performed by: ORTHOPAEDIC SURGERY

## 2025-06-03 PROCEDURE — 1125F AMNT PAIN NOTED PAIN PRSNT: CPT | Performed by: ORTHOPAEDIC SURGERY

## 2025-06-03 PROCEDURE — 99213 OFFICE O/P EST LOW 20 MIN: CPT | Performed by: ORTHOPAEDIC SURGERY

## 2025-06-03 PROCEDURE — 1123F ACP DISCUSS/DSCN MKR DOCD: CPT | Performed by: ORTHOPAEDIC SURGERY

## 2025-06-03 PROCEDURE — 1160F RVW MEDS BY RX/DR IN RCRD: CPT | Performed by: ORTHOPAEDIC SURGERY

## 2025-06-03 NOTE — PROGRESS NOTES
Segundo Albright  1950   Chief Complaint   Patient presents with    Results     MRI f/u        HISTORY OF PRESENT ILLNESS  Segundo Albright is a 75 y.o. male who presents today for reevaluation of right shoulder pain. Pain is a 1/10. Pt received a right shoulder cortisone injection on 1/6/2025 in which his shoulder did not respond to. Patient sleeps with his arm above his head. Patient reports waking up 3-4 times a night because of the pain. Patient stopped taking the Meloxicam for a few days and realized that the pain was only tolerable with the medication. Patient reports benefit with swimming.     Patient denies any fever, chills, chest pain, shortness of breath or calf pain. The remainder of the review of systems is negative. There are no new illnesses or injuries other than that mentioned above to report since last seen in the office. No changes in medications, allergies, social or family history.      PHYSICAL EXAM:   There were no vitals taken for this visit.  The patient is a well-developed, well-nourished male   in no acute distress.  The patient is alert and oriented times three. Mood and affect are normal.    LYMPHATIC: lymph nodes are not enlarged and are within normal limits    SKIN: normal in color and non tender to palpation. There are no bruises or abrasions noted.     NEUROLOGICAL: Motor sensory exam is within normal limits. Reflexes are equal bilaterally. There is normal sensation to pinprick and light touch    MUSCULOSKELETAL: Unchanged      PROCEDURE: none    IMAGING: MRI of right shoulder dated 5/29/2025 was read and reviewed by myself reveal:     IMPRESSION:  Severe glenohumeral osteoarthritis with chronic bony remodeling, very large  joint effusion, synovitis and multiple loose bodies in the joint space.     Severe subscapularis, moderate supraspinatus and mild infraspinatus tendinosis.     Moderate long head biceps tendinosis.     Moderate AC joint osteoarthritis.     Very mild

## 2025-07-05 SDOH — HEALTH STABILITY: PHYSICAL HEALTH: ON AVERAGE, HOW MANY DAYS PER WEEK DO YOU ENGAGE IN MODERATE TO STRENUOUS EXERCISE (LIKE A BRISK WALK)?: 6 DAYS

## 2025-07-05 SDOH — HEALTH STABILITY: PHYSICAL HEALTH: ON AVERAGE, HOW MANY MINUTES DO YOU ENGAGE IN EXERCISE AT THIS LEVEL?: 30 MIN

## 2025-07-05 ASSESSMENT — LIFESTYLE VARIABLES
HOW OFTEN DO YOU HAVE A DRINK CONTAINING ALCOHOL: 2-4 TIMES A MONTH
HOW MANY STANDARD DRINKS CONTAINING ALCOHOL DO YOU HAVE ON A TYPICAL DAY: 1 OR 2
HOW MANY STANDARD DRINKS CONTAINING ALCOHOL DO YOU HAVE ON A TYPICAL DAY: 1
HOW OFTEN DO YOU HAVE SIX OR MORE DRINKS ON ONE OCCASION: 1
HOW OFTEN DO YOU HAVE A DRINK CONTAINING ALCOHOL: 3

## 2025-07-05 ASSESSMENT — PATIENT HEALTH QUESTIONNAIRE - PHQ9
SUM OF ALL RESPONSES TO PHQ QUESTIONS 1-9: 0
2. FEELING DOWN, DEPRESSED OR HOPELESS: NOT AT ALL
SUM OF ALL RESPONSES TO PHQ QUESTIONS 1-9: 0
1. LITTLE INTEREST OR PLEASURE IN DOING THINGS: NOT AT ALL

## 2025-07-07 NOTE — PROGRESS NOTES
Chief Complaint   Patient presents with    Medicare AWV      Have you been to the ER, urgent care clinic since your last visit?  Hospitalized since your last visit?   YES - When: approximately 3  weeks ago.  Where and Why: on 06/19/2025 Urgent Care for tick bite.    Have you seen or consulted any other health care providers outside our system since your last visit?   YES, under the care of [facility]

## 2025-07-07 NOTE — PATIENT INSTRUCTIONS

## 2025-07-08 ENCOUNTER — OFFICE VISIT (OUTPATIENT)
Facility: CLINIC | Age: 75
End: 2025-07-08

## 2025-07-08 ENCOUNTER — OFFICE VISIT (OUTPATIENT)
Facility: CLINIC | Age: 75
End: 2025-07-08
Payer: MEDICARE

## 2025-07-08 VITALS
RESPIRATION RATE: 18 BRPM | SYSTOLIC BLOOD PRESSURE: 110 MMHG | BODY MASS INDEX: 25.34 KG/M2 | DIASTOLIC BLOOD PRESSURE: 80 MMHG | OXYGEN SATURATION: 98 % | HEART RATE: 66 BPM | HEIGHT: 71 IN | TEMPERATURE: 98.4 F | WEIGHT: 181 LBS

## 2025-07-08 VITALS
WEIGHT: 181 LBS | TEMPERATURE: 98.4 F | HEIGHT: 71 IN | SYSTOLIC BLOOD PRESSURE: 110 MMHG | OXYGEN SATURATION: 98 % | DIASTOLIC BLOOD PRESSURE: 80 MMHG | RESPIRATION RATE: 18 BRPM | HEART RATE: 66 BPM | BODY MASS INDEX: 25.34 KG/M2

## 2025-07-08 DIAGNOSIS — Z71.89 ACP (ADVANCE CARE PLANNING): ICD-10-CM

## 2025-07-08 DIAGNOSIS — Z00.00 MEDICARE ANNUAL WELLNESS VISIT, SUBSEQUENT: Primary | ICD-10-CM

## 2025-07-08 DIAGNOSIS — E78.00 HYPERCHOLESTEROLEMIA: Primary | ICD-10-CM

## 2025-07-08 DIAGNOSIS — E78.00 HYPERCHOLESTEROLEMIA: ICD-10-CM

## 2025-07-08 DIAGNOSIS — Z12.5 ENCOUNTER FOR SCREENING FOR MALIGNANT NEOPLASM OF PROSTATE: ICD-10-CM

## 2025-07-08 PROCEDURE — 1123F ACP DISCUSS/DSCN MKR DOCD: CPT | Performed by: FAMILY MEDICINE

## 2025-07-08 PROCEDURE — 1159F MED LIST DOCD IN RCRD: CPT | Performed by: FAMILY MEDICINE

## 2025-07-08 PROCEDURE — 1126F AMNT PAIN NOTED NONE PRSNT: CPT | Performed by: FAMILY MEDICINE

## 2025-07-08 PROCEDURE — G0439 PPPS, SUBSEQ VISIT: HCPCS | Performed by: FAMILY MEDICINE

## 2025-07-08 PROCEDURE — 1160F RVW MEDS BY RX/DR IN RCRD: CPT | Performed by: FAMILY MEDICINE

## 2025-07-08 NOTE — ACP (ADVANCE CARE PLANNING)
Advance Care Planning    Advance Care Planning (ACP) Provider Note - Comprehensive     Date of ACP Conversation: 07/8/25  Persons included in Conversation:  patient  Length of ACP Conversation in minutes:  <16 minutes    Authorized Decision Maker (if patient is incapable of making informed decisions):   This person is:  Healthcare Agent/Medical Power of  under Advance Directive  Rupal Albright, wife  Dolores Albright, older daughter ()   Eli Albright, youngest daughter (pediatric GI physician).        General ACP for ALL Patients with Decision Making Capacity:   Importance of advance care planning, including choosing a healthcare agent to communicate patient's healthcare decisions if patient lost the ability to make decisions, such as after a sudden illness or accident  Understanding of the healthcare agent role was assessed and information provided    Review of Existing Advance Directive:    On file, reviewed.    For Serious or Chronic Illness:  No known illness    Interventions Provided:  Reviewed existing Advance Directive   Recommended communicating the plan and making copies for the healthcare agent, personal physician, and others as appropriate (e.g., health system)  Recommended review of completed ACP document annually or upon change in health status  Reviewed annually      Advance Care Planning

## 2025-07-08 NOTE — PROGRESS NOTES
Chief Complaint   Patient presents with    Medicare AWV     Medicare Annual Wellness Visit    Segundo Albright is here for Medicare AWV    Assessment & Plan   Medicare annual wellness visit, subsequent  ACP (advance care planning)  Encounter for screening for malignant neoplasm of prostate  -     PSA Screening; Future  Hypercholesterolemia  -     Lipid Panel; Future     AWV annually.  ACP reviewed.    Return in about 1 year (around 7/8/2026) for annual medicare wellness / routine care.     Subjective     Patient's complete Health Risk Assessment and screening values have been reviewed and are found in Flowsheets. The following problems were reviewed today and where indicated follow up appointments were made and/or referrals ordered.    Positive Risk Factor Screenings with Interventions:                    Safety:  Do you have non-slip mats or non-slip surfaces or shower bars or grab bars in your shower or bathtub?: (!) (Patient-Rptd) No  Interventions:  Pt handout                 Objective   Vitals:    07/08/25 0823   BP: 110/80   BP Site: Left Upper Arm   Patient Position: Sitting   BP Cuff Size: Large Adult   Pulse: 66   Resp: 18   Temp: 98.4 °F (36.9 °C)   TempSrc: Skin   SpO2: 98%   Weight: 82.1 kg (181 lb)   Height: 1.803 m (5' 11\")      Body mass index is 25.24 kg/m².                Allergies   Allergen Reactions    Benzocaine Swelling    Codeine Nausea Only     Prior to Visit Medications    Medication Sig Taking? Authorizing Provider   tamsulosin (FLOMAX) 0.4 MG capsule Take 1 capsule by mouth daily  Robert Merino APRN - NP   tadalafil (CIALIS) 5 MG tablet Take 1 tablet by mouth as needed for Erectile Dysfunction  Robert Merino APRN - NP   meloxicam (MOBIC) 15 MG tablet TAKE 1 TABLET BY MOUTH DAILY  Jovanni Gant MD   rosuvastatin (CRESTOR) 5 MG tablet Take 1 tablet by mouth nightly  Matthew Levy MD   acetaminophen (TYLENOL 8 HOUR) 650 MG extended release tablet Take 1 tablet by mouth

## 2025-07-08 NOTE — PROGRESS NOTES
Chief Complaint   Patient presents with    Hyperlipidemia    Shoulder Pain     C/O right shoulder pain (ongoing past 9 months)       Have you been to the ER, urgent care clinic since your last visit?  Hospitalized since your last visit?   YES - When: approximately 3  weeks ago.  Where and Why: on 06/19/2025 Urgent Care for tick bite.    Have you seen or consulted any other health care providers outside our system since your last visit?   YES, under the care of [place] [facility]

## 2025-07-08 NOTE — PATIENT INSTRUCTIONS
Patient Education        A Healthy Lifestyle: Care Instructions  A healthy lifestyle can help you feel good, have more energy, and stay at a weight that's healthy for you. You can share a healthy lifestyle with your friends and family. And you can do it on your own.    Eat meals with your friends or family. You could try cooking together.   Plan activities with other people. Go for a walk with a friend, try a free online fitness class, or join a sports league.     Eat a variety of healthy foods. These include fruits, vegetables, whole grains, low-fat dairy, and lean protein.   Choose healthy portions of food. You can use the Nutrition Facts label on food packages as a guide.     Eat more fruits and vegetables. You could add vegetables to sandwiches or add fruit to cereal.   Drink water when you are thirsty. Limit soda, juice, and sports drinks.     Try to exercise most days. Aim for at least 2½ hours of exercise each week.   Keep moving. Work in the garden or take your dog on a walk. Use the stairs instead of the elevator.     If you use tobacco or nicotine, try to quit. Ask your doctor about programs and medicines to help you quit.   Limit alcohol. Men should have no more than 2 drinks a day. Women should have no more than 1. For some people, no alcohol is the best choice.   Follow-up care is a key part of your treatment and safety. Be sure to make and go to all appointments, and call your doctor if you are having problems. It's also a good idea to know your test results and keep a list of the medicines you take.  Where can you learn more?  Go to https://www.[employer]net/patientEd and enter U807 to learn more about \"A Healthy Lifestyle: Care Instructions.\"  Current as of: April 30, 2024  Content Version: 14.5  © 3405-6444 [employer] [facility] [employer].   Care instructions adapted under license by [employer]. If you have questions about a medical condition or this instruction, always ask your healthcare professional.

## 2025-07-28 DIAGNOSIS — E78.00 PURE HYPERCHOLESTEROLEMIA: Primary | ICD-10-CM

## 2025-07-28 RX ORDER — ROSUVASTATIN CALCIUM 5 MG/1
5 TABLET, COATED ORAL NIGHTLY
Qty: 90 TABLET | Refills: 3 | Status: SHIPPED | OUTPATIENT
Start: 2025-07-28

## 2025-09-05 ENCOUNTER — OFFICE VISIT (OUTPATIENT)
Age: 75
End: 2025-09-05
Payer: MEDICARE

## 2025-09-05 VITALS
OXYGEN SATURATION: 97 % | HEART RATE: 62 BPM | DIASTOLIC BLOOD PRESSURE: 70 MMHG | BODY MASS INDEX: 25.2 KG/M2 | WEIGHT: 180 LBS | HEIGHT: 71 IN | SYSTOLIC BLOOD PRESSURE: 130 MMHG

## 2025-09-05 DIAGNOSIS — E78.00 PURE HYPERCHOLESTEROLEMIA: ICD-10-CM

## 2025-09-05 DIAGNOSIS — I35.1 AORTIC VALVE INSUFFICIENCY, ETIOLOGY OF CARDIAC VALVE DISEASE UNSPECIFIED: Primary | ICD-10-CM

## 2025-09-05 PROCEDURE — 1123F ACP DISCUSS/DSCN MKR DOCD: CPT | Performed by: INTERNAL MEDICINE

## 2025-09-05 PROCEDURE — 99214 OFFICE O/P EST MOD 30 MIN: CPT | Performed by: INTERNAL MEDICINE

## 2025-09-05 PROCEDURE — 1126F AMNT PAIN NOTED NONE PRSNT: CPT | Performed by: INTERNAL MEDICINE

## 2025-09-05 RX ORDER — ROSUVASTATIN CALCIUM 5 MG/1
5 TABLET, COATED ORAL NIGHTLY
Qty: 90 TABLET | Refills: 3 | Status: SHIPPED | OUTPATIENT
Start: 2025-09-05